# Patient Record
Sex: MALE | Race: OTHER
[De-identification: names, ages, dates, MRNs, and addresses within clinical notes are randomized per-mention and may not be internally consistent; named-entity substitution may affect disease eponyms.]

---

## 2019-10-28 ENCOUNTER — HOSPITAL ENCOUNTER (INPATIENT)
Dept: HOSPITAL 15 - ER | Age: 64
LOS: 9 days | Discharge: HOME HEALTH | DRG: 871 | End: 2019-11-06
Attending: INTERNAL MEDICINE | Admitting: NURSE PRACTITIONER
Payer: MEDICARE

## 2019-10-28 VITALS — SYSTOLIC BLOOD PRESSURE: 99 MMHG | DIASTOLIC BLOOD PRESSURE: 67 MMHG

## 2019-10-28 VITALS — SYSTOLIC BLOOD PRESSURE: 94 MMHG | DIASTOLIC BLOOD PRESSURE: 59 MMHG

## 2019-10-28 VITALS — SYSTOLIC BLOOD PRESSURE: 100 MMHG | DIASTOLIC BLOOD PRESSURE: 62 MMHG

## 2019-10-28 VITALS — DIASTOLIC BLOOD PRESSURE: 62 MMHG | SYSTOLIC BLOOD PRESSURE: 96 MMHG

## 2019-10-28 VITALS — DIASTOLIC BLOOD PRESSURE: 68 MMHG | SYSTOLIC BLOOD PRESSURE: 100 MMHG

## 2019-10-28 VITALS — DIASTOLIC BLOOD PRESSURE: 78 MMHG | SYSTOLIC BLOOD PRESSURE: 117 MMHG

## 2019-10-28 VITALS — SYSTOLIC BLOOD PRESSURE: 115 MMHG | DIASTOLIC BLOOD PRESSURE: 72 MMHG

## 2019-10-28 VITALS — SYSTOLIC BLOOD PRESSURE: 94 MMHG | DIASTOLIC BLOOD PRESSURE: 58 MMHG

## 2019-10-28 VITALS — SYSTOLIC BLOOD PRESSURE: 115 MMHG | DIASTOLIC BLOOD PRESSURE: 81 MMHG

## 2019-10-28 VITALS — DIASTOLIC BLOOD PRESSURE: 65 MMHG | SYSTOLIC BLOOD PRESSURE: 100 MMHG

## 2019-10-28 VITALS — HEIGHT: 71 IN | BODY MASS INDEX: 28.06 KG/M2 | WEIGHT: 200.4 LBS

## 2019-10-28 VITALS — DIASTOLIC BLOOD PRESSURE: 57 MMHG | SYSTOLIC BLOOD PRESSURE: 89 MMHG

## 2019-10-28 VITALS — SYSTOLIC BLOOD PRESSURE: 94 MMHG | DIASTOLIC BLOOD PRESSURE: 48 MMHG

## 2019-10-28 VITALS — DIASTOLIC BLOOD PRESSURE: 73 MMHG | SYSTOLIC BLOOD PRESSURE: 117 MMHG

## 2019-10-28 VITALS — SYSTOLIC BLOOD PRESSURE: 103 MMHG | DIASTOLIC BLOOD PRESSURE: 71 MMHG

## 2019-10-28 VITALS — DIASTOLIC BLOOD PRESSURE: 68 MMHG | SYSTOLIC BLOOD PRESSURE: 94 MMHG

## 2019-10-28 VITALS — DIASTOLIC BLOOD PRESSURE: 71 MMHG | SYSTOLIC BLOOD PRESSURE: 103 MMHG

## 2019-10-28 VITALS — DIASTOLIC BLOOD PRESSURE: 71 MMHG | SYSTOLIC BLOOD PRESSURE: 108 MMHG

## 2019-10-28 VITALS — DIASTOLIC BLOOD PRESSURE: 72 MMHG | SYSTOLIC BLOOD PRESSURE: 117 MMHG

## 2019-10-28 DIAGNOSIS — J15.6: ICD-10-CM

## 2019-10-28 DIAGNOSIS — I50.33: ICD-10-CM

## 2019-10-28 DIAGNOSIS — J44.0: ICD-10-CM

## 2019-10-28 DIAGNOSIS — E11.9: ICD-10-CM

## 2019-10-28 DIAGNOSIS — A41.9: Primary | ICD-10-CM

## 2019-10-28 DIAGNOSIS — Z99.11: ICD-10-CM

## 2019-10-28 DIAGNOSIS — Z79.84: ICD-10-CM

## 2019-10-28 DIAGNOSIS — J96.02: ICD-10-CM

## 2019-10-28 DIAGNOSIS — I11.0: ICD-10-CM

## 2019-10-28 DIAGNOSIS — G92: ICD-10-CM

## 2019-10-28 DIAGNOSIS — E78.5: ICD-10-CM

## 2019-10-28 DIAGNOSIS — J96.01: ICD-10-CM

## 2019-10-28 DIAGNOSIS — N17.0: ICD-10-CM

## 2019-10-28 DIAGNOSIS — Z90.49: ICD-10-CM

## 2019-10-28 DIAGNOSIS — E78.00: ICD-10-CM

## 2019-10-28 DIAGNOSIS — F17.200: ICD-10-CM

## 2019-10-28 DIAGNOSIS — R65.21: ICD-10-CM

## 2019-10-28 DIAGNOSIS — E44.1: ICD-10-CM

## 2019-10-28 DIAGNOSIS — M19.90: ICD-10-CM

## 2019-10-28 LAB
ALBUMIN SERPL-MCNC: 3.1 G/DL (ref 3.4–5)
ALP SERPL-CCNC: 69 U/L (ref 45–117)
ALT SERPL-CCNC: 22 U/L (ref 16–61)
ANION GAP SERPL CALCULATED.3IONS-SCNC: 16 MMOL/L (ref 5–15)
APTT PPP: 29 SEC (ref 23.64–32.05)
BILIRUB SERPL-MCNC: 1 MG/DL (ref 0.2–1)
BUN SERPL-MCNC: 45 MG/DL (ref 7–18)
BUN/CREAT SERPL: 12.1
CALCIUM SERPL-MCNC: 8.7 MG/DL (ref 8.5–10.1)
CHLORIDE SERPL-SCNC: 103 MMOL/L (ref 98–107)
CO2 SERPL-SCNC: 19 MMOL/L (ref 21–32)
GLUCOSE SERPL-MCNC: 172 MG/DL (ref 74–106)
HCT VFR BLD AUTO: 43.4 % (ref 41–53)
HGB BLD-MCNC: 15.2 G/DL (ref 13.5–17.5)
INR PPP: 1.38 (ref 0.9–1.15)
LACTATE PLASV-SCNC: 4.4 MMOL/L (ref 0.4–2)
MCH RBC QN AUTO: 32.8 PG (ref 28–32)
MCV RBC AUTO: 93.5 FL (ref 80–100)
NRBC BLD QL AUTO: 0.1 %
POTASSIUM SERPL-SCNC: 3.7 MMOL/L (ref 3.5–5.1)
PROT SERPL-MCNC: 7.5 G/DL (ref 6.4–8.2)
SODIUM SERPL-SCNC: 138 MMOL/L (ref 136–145)

## 2019-10-28 PROCEDURE — 80307 DRUG TEST PRSMV CHEM ANLYZR: CPT

## 2019-10-28 PROCEDURE — 82962 GLUCOSE BLOOD TEST: CPT

## 2019-10-28 PROCEDURE — 93970 EXTREMITY STUDY: CPT

## 2019-10-28 PROCEDURE — 80061 LIPID PANEL: CPT

## 2019-10-28 PROCEDURE — 83880 ASSAY OF NATRIURETIC PEPTIDE: CPT

## 2019-10-28 PROCEDURE — 71250 CT THORAX DX C-: CPT

## 2019-10-28 PROCEDURE — 36600 WITHDRAWAL OF ARTERIAL BLOOD: CPT

## 2019-10-28 PROCEDURE — 87186 SC STD MICRODIL/AGAR DIL: CPT

## 2019-10-28 PROCEDURE — 87205 SMEAR GRAM STAIN: CPT

## 2019-10-28 PROCEDURE — 93005 ELECTROCARDIOGRAM TRACING: CPT

## 2019-10-28 PROCEDURE — 83036 HEMOGLOBIN GLYCOSYLATED A1C: CPT

## 2019-10-28 PROCEDURE — 87070 CULTURE OTHR SPECIMN AEROBIC: CPT

## 2019-10-28 PROCEDURE — 94003 VENT MGMT INPAT SUBQ DAY: CPT

## 2019-10-28 PROCEDURE — 83605 ASSAY OF LACTIC ACID: CPT

## 2019-10-28 PROCEDURE — 94640 AIRWAY INHALATION TREATMENT: CPT

## 2019-10-28 PROCEDURE — 85027 COMPLETE CBC AUTOMATED: CPT

## 2019-10-28 PROCEDURE — 36415 COLL VENOUS BLD VENIPUNCTURE: CPT

## 2019-10-28 PROCEDURE — 82805 BLOOD GASES W/O2 SATURATION: CPT

## 2019-10-28 PROCEDURE — 0BH17EZ INSERTION OF ENDOTRACHEAL AIRWAY INTO TRACHEA, VIA NATURAL OR ARTIFICIAL OPENING: ICD-10-PCS | Performed by: INTERNAL MEDICINE

## 2019-10-28 PROCEDURE — 96365 THER/PROPH/DIAG IV INF INIT: CPT

## 2019-10-28 PROCEDURE — 97116 GAIT TRAINING THERAPY: CPT

## 2019-10-28 PROCEDURE — 85025 COMPLETE CBC W/AUTO DIFF WBC: CPT

## 2019-10-28 PROCEDURE — 84702 CHORIONIC GONADOTROPIN TEST: CPT

## 2019-10-28 PROCEDURE — 02HV33Z INSERTION OF INFUSION DEVICE INTO SUPERIOR VENA CAVA, PERCUTANEOUS APPROACH: ICD-10-PCS | Performed by: RADIOLOGY

## 2019-10-28 PROCEDURE — 87804 INFLUENZA ASSAY W/OPTIC: CPT

## 2019-10-28 PROCEDURE — 96367 TX/PROPH/DG ADDL SEQ IV INF: CPT

## 2019-10-28 PROCEDURE — 81001 URINALYSIS AUTO W/SCOPE: CPT

## 2019-10-28 PROCEDURE — 71045 X-RAY EXAM CHEST 1 VIEW: CPT

## 2019-10-28 PROCEDURE — 97163 PT EVAL HIGH COMPLEX 45 MIN: CPT

## 2019-10-28 PROCEDURE — 94002 VENT MGMT INPAT INIT DAY: CPT

## 2019-10-28 PROCEDURE — 5A1945Z RESPIRATORY VENTILATION, 24-96 CONSECUTIVE HOURS: ICD-10-PCS | Performed by: INTERNAL MEDICINE

## 2019-10-28 PROCEDURE — 99291 CRITICAL CARE FIRST HOUR: CPT

## 2019-10-28 PROCEDURE — 87040 BLOOD CULTURE FOR BACTERIA: CPT

## 2019-10-28 PROCEDURE — 36556 INSERT NON-TUNNEL CV CATH: CPT

## 2019-10-28 PROCEDURE — 51702 INSERT TEMP BLADDER CATH: CPT

## 2019-10-28 PROCEDURE — 85730 THROMBOPLASTIN TIME PARTIAL: CPT

## 2019-10-28 PROCEDURE — 31624 DX BRONCHOSCOPE/LAVAGE: CPT

## 2019-10-28 PROCEDURE — 31500 INSERT EMERGENCY AIRWAY: CPT

## 2019-10-28 PROCEDURE — 82550 ASSAY OF CK (CPK): CPT

## 2019-10-28 PROCEDURE — 84484 ASSAY OF TROPONIN QUANT: CPT

## 2019-10-28 PROCEDURE — 97530 THERAPEUTIC ACTIVITIES: CPT

## 2019-10-28 PROCEDURE — 74176 CT ABD & PELVIS W/O CONTRAST: CPT

## 2019-10-28 PROCEDURE — 85007 BL SMEAR W/DIFF WBC COUNT: CPT

## 2019-10-28 PROCEDURE — 83735 ASSAY OF MAGNESIUM: CPT

## 2019-10-28 PROCEDURE — 31622 DX BRONCHOSCOPE/WASH: CPT

## 2019-10-28 PROCEDURE — 85379 FIBRIN DEGRADATION QUANT: CPT

## 2019-10-28 PROCEDURE — 87077 CULTURE AEROBIC IDENTIFY: CPT

## 2019-10-28 PROCEDURE — 80048 BASIC METABOLIC PNL TOTAL CA: CPT

## 2019-10-28 PROCEDURE — 80053 COMPREHEN METABOLIC PANEL: CPT

## 2019-10-28 PROCEDURE — 85610 PROTHROMBIN TIME: CPT

## 2019-10-28 PROCEDURE — 93306 TTE W/DOPPLER COMPLETE: CPT

## 2019-10-28 PROCEDURE — 87081 CULTURE SCREEN ONLY: CPT

## 2019-10-28 RX ADMIN — FENTANYL CITRATE SCH MLS/HR: 50 INJECTION, SOLUTION INTRAMUSCULAR; INTRAVENOUS at 18:44

## 2019-10-28 RX ADMIN — SODIUM CHLORIDE SCH MLS/HR: 0.9 INJECTION, SOLUTION INTRAVENOUS at 19:20

## 2019-10-28 RX ADMIN — SODIUM CHLORIDE SCH MG: 9 INJECTION, SOLUTION INTRAVENOUS at 23:00

## 2019-10-28 RX ADMIN — AZITHROMYCIN DIHYDRATE SCH MLS/HR: 500 INJECTION, POWDER, LYOPHILIZED, FOR SOLUTION INTRAVENOUS at 20:37

## 2019-10-28 RX ADMIN — NOREPINEPHRINE BITARTRATE SCH MLS/HR: 1 INJECTION, SOLUTION, CONCENTRATE INTRAVENOUS at 18:15

## 2019-10-28 RX ADMIN — MIDAZOLAM SCH MLS/HR: 5 INJECTION, SOLUTION INTRAMUSCULAR; INTRAVENOUS at 18:18

## 2019-10-28 NOTE — NUR
ELEVATED HR/ABG

NP CHE PAGED. UPDATED REGARDING MOST RECENT ABG RESULTS AND CURRENT HR. ORDERS RECEIVED.

## 2019-10-28 NOTE — NUR
Respiratory note:

ET TUBE MOVED FROM 22 CM AT THE LIP TO 20 CM AT THE LIP AFTER REVIEW OF CHEST XRAY.

## 2019-10-28 NOTE — NUR
FAMILY VISIT

PT DAUGHTER TO BEDSIDE FOR VISIT. PT CONDITION AND PLAN OF CARE DISCUSSED. ALL QUESTIONS AND 
CONCERNS ADDRESSED.

## 2019-10-28 NOTE — NUR
Admit to ICU from ER on NAATLYA Laurent Admitted to ICU via gurney on cardiac monitor, intubated and being bagged 
by Respiratory Therapist. Patient transfered to bed, connected to mechanical ventilator by 
therapist, LINDA at bedside.  Patient connected to ICU monitoring, weighed by bedscale, 
oriented to Carol Granados, primary RN, unit, ventilator and sedation.

NOTE: PT TO UNIT ON LEVOPHED GTT 25 MCG/MIN, FENTANYL 150 MCG/HR, AND VERSED 15 MG/HR. 
INCREASED WORK OF BREATHING OBSERVED. INCREASED FENTANYL GTT. SEE IV SPREADSHEET. SINUS TACH 
ON CARDIAC MONITOR. PT WITH NGT TO L. NARE CLAMPED. PLACEMENT VERIFIED. R IJ TLC IN PLACE 
ALL PORTS PATENT. L. EJ S/L B&P. LAGUNA TO GRAVITY DRAINING CLOUDY LIGHT MARISSA URINE. BED IN 
LOWEST LOCKED POSITION. HOB ELEVATED. SIDE RAILS UP X 2. NO INDICATION OF PAIN OBSERVED. PT 
IN FULL VIEW OF RN STATION. WILL CONTINUE TO MONITOR.

## 2019-10-29 VITALS — SYSTOLIC BLOOD PRESSURE: 113 MMHG | DIASTOLIC BLOOD PRESSURE: 71 MMHG

## 2019-10-29 VITALS — DIASTOLIC BLOOD PRESSURE: 68 MMHG | SYSTOLIC BLOOD PRESSURE: 95 MMHG

## 2019-10-29 VITALS — DIASTOLIC BLOOD PRESSURE: 53 MMHG | SYSTOLIC BLOOD PRESSURE: 93 MMHG

## 2019-10-29 VITALS — DIASTOLIC BLOOD PRESSURE: 78 MMHG | SYSTOLIC BLOOD PRESSURE: 110 MMHG

## 2019-10-29 VITALS — SYSTOLIC BLOOD PRESSURE: 108 MMHG | DIASTOLIC BLOOD PRESSURE: 68 MMHG

## 2019-10-29 VITALS — SYSTOLIC BLOOD PRESSURE: 100 MMHG | DIASTOLIC BLOOD PRESSURE: 77 MMHG

## 2019-10-29 VITALS — SYSTOLIC BLOOD PRESSURE: 109 MMHG | DIASTOLIC BLOOD PRESSURE: 68 MMHG

## 2019-10-29 VITALS — DIASTOLIC BLOOD PRESSURE: 70 MMHG | SYSTOLIC BLOOD PRESSURE: 128 MMHG

## 2019-10-29 VITALS — SYSTOLIC BLOOD PRESSURE: 96 MMHG | DIASTOLIC BLOOD PRESSURE: 60 MMHG

## 2019-10-29 VITALS — DIASTOLIC BLOOD PRESSURE: 68 MMHG | SYSTOLIC BLOOD PRESSURE: 113 MMHG

## 2019-10-29 VITALS — SYSTOLIC BLOOD PRESSURE: 110 MMHG | DIASTOLIC BLOOD PRESSURE: 67 MMHG

## 2019-10-29 VITALS — DIASTOLIC BLOOD PRESSURE: 66 MMHG | SYSTOLIC BLOOD PRESSURE: 135 MMHG

## 2019-10-29 VITALS — SYSTOLIC BLOOD PRESSURE: 128 MMHG | DIASTOLIC BLOOD PRESSURE: 74 MMHG

## 2019-10-29 VITALS — DIASTOLIC BLOOD PRESSURE: 74 MMHG | SYSTOLIC BLOOD PRESSURE: 126 MMHG

## 2019-10-29 VITALS — SYSTOLIC BLOOD PRESSURE: 82 MMHG | DIASTOLIC BLOOD PRESSURE: 54 MMHG

## 2019-10-29 VITALS — SYSTOLIC BLOOD PRESSURE: 83 MMHG | DIASTOLIC BLOOD PRESSURE: 49 MMHG

## 2019-10-29 VITALS — SYSTOLIC BLOOD PRESSURE: 100 MMHG | DIASTOLIC BLOOD PRESSURE: 61 MMHG

## 2019-10-29 VITALS — DIASTOLIC BLOOD PRESSURE: 68 MMHG | SYSTOLIC BLOOD PRESSURE: 120 MMHG

## 2019-10-29 VITALS — SYSTOLIC BLOOD PRESSURE: 103 MMHG | DIASTOLIC BLOOD PRESSURE: 70 MMHG

## 2019-10-29 VITALS — SYSTOLIC BLOOD PRESSURE: 121 MMHG | DIASTOLIC BLOOD PRESSURE: 73 MMHG

## 2019-10-29 VITALS — SYSTOLIC BLOOD PRESSURE: 89 MMHG | DIASTOLIC BLOOD PRESSURE: 58 MMHG

## 2019-10-29 VITALS — DIASTOLIC BLOOD PRESSURE: 60 MMHG | SYSTOLIC BLOOD PRESSURE: 98 MMHG

## 2019-10-29 VITALS — SYSTOLIC BLOOD PRESSURE: 125 MMHG | DIASTOLIC BLOOD PRESSURE: 73 MMHG

## 2019-10-29 VITALS — DIASTOLIC BLOOD PRESSURE: 67 MMHG | SYSTOLIC BLOOD PRESSURE: 118 MMHG

## 2019-10-29 VITALS — DIASTOLIC BLOOD PRESSURE: 52 MMHG | SYSTOLIC BLOOD PRESSURE: 83 MMHG

## 2019-10-29 VITALS — SYSTOLIC BLOOD PRESSURE: 83 MMHG | DIASTOLIC BLOOD PRESSURE: 59 MMHG

## 2019-10-29 VITALS — SYSTOLIC BLOOD PRESSURE: 112 MMHG | DIASTOLIC BLOOD PRESSURE: 69 MMHG

## 2019-10-29 VITALS — DIASTOLIC BLOOD PRESSURE: 73 MMHG | SYSTOLIC BLOOD PRESSURE: 123 MMHG

## 2019-10-29 VITALS — DIASTOLIC BLOOD PRESSURE: 63 MMHG | SYSTOLIC BLOOD PRESSURE: 108 MMHG

## 2019-10-29 VITALS — DIASTOLIC BLOOD PRESSURE: 75 MMHG | SYSTOLIC BLOOD PRESSURE: 131 MMHG

## 2019-10-29 VITALS — SYSTOLIC BLOOD PRESSURE: 81 MMHG | DIASTOLIC BLOOD PRESSURE: 50 MMHG

## 2019-10-29 VITALS — SYSTOLIC BLOOD PRESSURE: 98 MMHG | DIASTOLIC BLOOD PRESSURE: 65 MMHG

## 2019-10-29 VITALS — DIASTOLIC BLOOD PRESSURE: 55 MMHG | SYSTOLIC BLOOD PRESSURE: 84 MMHG

## 2019-10-29 VITALS — SYSTOLIC BLOOD PRESSURE: 124 MMHG | DIASTOLIC BLOOD PRESSURE: 75 MMHG

## 2019-10-29 VITALS — SYSTOLIC BLOOD PRESSURE: 126 MMHG | DIASTOLIC BLOOD PRESSURE: 72 MMHG

## 2019-10-29 VITALS — DIASTOLIC BLOOD PRESSURE: 56 MMHG | SYSTOLIC BLOOD PRESSURE: 88 MMHG

## 2019-10-29 VITALS — SYSTOLIC BLOOD PRESSURE: 85 MMHG | DIASTOLIC BLOOD PRESSURE: 55 MMHG

## 2019-10-29 VITALS — DIASTOLIC BLOOD PRESSURE: 62 MMHG | SYSTOLIC BLOOD PRESSURE: 88 MMHG

## 2019-10-29 VITALS — DIASTOLIC BLOOD PRESSURE: 69 MMHG | SYSTOLIC BLOOD PRESSURE: 129 MMHG

## 2019-10-29 VITALS — SYSTOLIC BLOOD PRESSURE: 89 MMHG | DIASTOLIC BLOOD PRESSURE: 48 MMHG

## 2019-10-29 VITALS — DIASTOLIC BLOOD PRESSURE: 58 MMHG | SYSTOLIC BLOOD PRESSURE: 88 MMHG

## 2019-10-29 VITALS — SYSTOLIC BLOOD PRESSURE: 111 MMHG | DIASTOLIC BLOOD PRESSURE: 68 MMHG

## 2019-10-29 VITALS — DIASTOLIC BLOOD PRESSURE: 72 MMHG | SYSTOLIC BLOOD PRESSURE: 108 MMHG

## 2019-10-29 VITALS — SYSTOLIC BLOOD PRESSURE: 84 MMHG | DIASTOLIC BLOOD PRESSURE: 53 MMHG

## 2019-10-29 VITALS — SYSTOLIC BLOOD PRESSURE: 109 MMHG | DIASTOLIC BLOOD PRESSURE: 69 MMHG

## 2019-10-29 VITALS — DIASTOLIC BLOOD PRESSURE: 60 MMHG | SYSTOLIC BLOOD PRESSURE: 89 MMHG

## 2019-10-29 VITALS — SYSTOLIC BLOOD PRESSURE: 85 MMHG | DIASTOLIC BLOOD PRESSURE: 50 MMHG

## 2019-10-29 VITALS — SYSTOLIC BLOOD PRESSURE: 120 MMHG | DIASTOLIC BLOOD PRESSURE: 72 MMHG

## 2019-10-29 VITALS — DIASTOLIC BLOOD PRESSURE: 71 MMHG | SYSTOLIC BLOOD PRESSURE: 115 MMHG

## 2019-10-29 VITALS — DIASTOLIC BLOOD PRESSURE: 55 MMHG | SYSTOLIC BLOOD PRESSURE: 80 MMHG

## 2019-10-29 VITALS — SYSTOLIC BLOOD PRESSURE: 103 MMHG | DIASTOLIC BLOOD PRESSURE: 73 MMHG

## 2019-10-29 VITALS — DIASTOLIC BLOOD PRESSURE: 62 MMHG | SYSTOLIC BLOOD PRESSURE: 102 MMHG

## 2019-10-29 VITALS — DIASTOLIC BLOOD PRESSURE: 68 MMHG | SYSTOLIC BLOOD PRESSURE: 110 MMHG

## 2019-10-29 VITALS — DIASTOLIC BLOOD PRESSURE: 64 MMHG | SYSTOLIC BLOOD PRESSURE: 130 MMHG

## 2019-10-29 VITALS — DIASTOLIC BLOOD PRESSURE: 67 MMHG | SYSTOLIC BLOOD PRESSURE: 107 MMHG

## 2019-10-29 VITALS — SYSTOLIC BLOOD PRESSURE: 73 MMHG | DIASTOLIC BLOOD PRESSURE: 46 MMHG

## 2019-10-29 VITALS — DIASTOLIC BLOOD PRESSURE: 69 MMHG | SYSTOLIC BLOOD PRESSURE: 112 MMHG

## 2019-10-29 VITALS — SYSTOLIC BLOOD PRESSURE: 100 MMHG | DIASTOLIC BLOOD PRESSURE: 64 MMHG

## 2019-10-29 VITALS — SYSTOLIC BLOOD PRESSURE: 113 MMHG | DIASTOLIC BLOOD PRESSURE: 75 MMHG

## 2019-10-29 VITALS — SYSTOLIC BLOOD PRESSURE: 92 MMHG | DIASTOLIC BLOOD PRESSURE: 60 MMHG

## 2019-10-29 VITALS — SYSTOLIC BLOOD PRESSURE: 97 MMHG | DIASTOLIC BLOOD PRESSURE: 57 MMHG

## 2019-10-29 VITALS — DIASTOLIC BLOOD PRESSURE: 68 MMHG | SYSTOLIC BLOOD PRESSURE: 111 MMHG

## 2019-10-29 VITALS — SYSTOLIC BLOOD PRESSURE: 143 MMHG | DIASTOLIC BLOOD PRESSURE: 71 MMHG

## 2019-10-29 VITALS — SYSTOLIC BLOOD PRESSURE: 131 MMHG | DIASTOLIC BLOOD PRESSURE: 74 MMHG

## 2019-10-29 VITALS — DIASTOLIC BLOOD PRESSURE: 49 MMHG | SYSTOLIC BLOOD PRESSURE: 81 MMHG

## 2019-10-29 VITALS — SYSTOLIC BLOOD PRESSURE: 112 MMHG | DIASTOLIC BLOOD PRESSURE: 74 MMHG

## 2019-10-29 VITALS — SYSTOLIC BLOOD PRESSURE: 115 MMHG | DIASTOLIC BLOOD PRESSURE: 67 MMHG

## 2019-10-29 VITALS — SYSTOLIC BLOOD PRESSURE: 119 MMHG | DIASTOLIC BLOOD PRESSURE: 70 MMHG

## 2019-10-29 VITALS — SYSTOLIC BLOOD PRESSURE: 89 MMHG | DIASTOLIC BLOOD PRESSURE: 60 MMHG

## 2019-10-29 VITALS — DIASTOLIC BLOOD PRESSURE: 57 MMHG | SYSTOLIC BLOOD PRESSURE: 85 MMHG

## 2019-10-29 VITALS — DIASTOLIC BLOOD PRESSURE: 73 MMHG | SYSTOLIC BLOOD PRESSURE: 127 MMHG

## 2019-10-29 VITALS — DIASTOLIC BLOOD PRESSURE: 60 MMHG | SYSTOLIC BLOOD PRESSURE: 121 MMHG

## 2019-10-29 VITALS — DIASTOLIC BLOOD PRESSURE: 50 MMHG | SYSTOLIC BLOOD PRESSURE: 99 MMHG

## 2019-10-29 VITALS — DIASTOLIC BLOOD PRESSURE: 68 MMHG | SYSTOLIC BLOOD PRESSURE: 109 MMHG

## 2019-10-29 VITALS — SYSTOLIC BLOOD PRESSURE: 79 MMHG | DIASTOLIC BLOOD PRESSURE: 49 MMHG

## 2019-10-29 VITALS — DIASTOLIC BLOOD PRESSURE: 70 MMHG | SYSTOLIC BLOOD PRESSURE: 126 MMHG

## 2019-10-29 VITALS — DIASTOLIC BLOOD PRESSURE: 71 MMHG | SYSTOLIC BLOOD PRESSURE: 116 MMHG

## 2019-10-29 VITALS — SYSTOLIC BLOOD PRESSURE: 111 MMHG | DIASTOLIC BLOOD PRESSURE: 69 MMHG

## 2019-10-29 VITALS — DIASTOLIC BLOOD PRESSURE: 81 MMHG | SYSTOLIC BLOOD PRESSURE: 127 MMHG

## 2019-10-29 VITALS — SYSTOLIC BLOOD PRESSURE: 118 MMHG | DIASTOLIC BLOOD PRESSURE: 71 MMHG

## 2019-10-29 VITALS — DIASTOLIC BLOOD PRESSURE: 71 MMHG | SYSTOLIC BLOOD PRESSURE: 112 MMHG

## 2019-10-29 VITALS — SYSTOLIC BLOOD PRESSURE: 90 MMHG | DIASTOLIC BLOOD PRESSURE: 53 MMHG

## 2019-10-29 VITALS — DIASTOLIC BLOOD PRESSURE: 56 MMHG | SYSTOLIC BLOOD PRESSURE: 81 MMHG

## 2019-10-29 VITALS — SYSTOLIC BLOOD PRESSURE: 107 MMHG | DIASTOLIC BLOOD PRESSURE: 67 MMHG

## 2019-10-29 VITALS — SYSTOLIC BLOOD PRESSURE: 126 MMHG | DIASTOLIC BLOOD PRESSURE: 73 MMHG

## 2019-10-29 VITALS — DIASTOLIC BLOOD PRESSURE: 74 MMHG | SYSTOLIC BLOOD PRESSURE: 128 MMHG

## 2019-10-29 VITALS — DIASTOLIC BLOOD PRESSURE: 50 MMHG | SYSTOLIC BLOOD PRESSURE: 82 MMHG

## 2019-10-29 VITALS — SYSTOLIC BLOOD PRESSURE: 90 MMHG | DIASTOLIC BLOOD PRESSURE: 57 MMHG

## 2019-10-29 VITALS — SYSTOLIC BLOOD PRESSURE: 88 MMHG | DIASTOLIC BLOOD PRESSURE: 62 MMHG

## 2019-10-29 VITALS — DIASTOLIC BLOOD PRESSURE: 70 MMHG | SYSTOLIC BLOOD PRESSURE: 127 MMHG

## 2019-10-29 VITALS — SYSTOLIC BLOOD PRESSURE: 122 MMHG | DIASTOLIC BLOOD PRESSURE: 77 MMHG

## 2019-10-29 VITALS — DIASTOLIC BLOOD PRESSURE: 54 MMHG | SYSTOLIC BLOOD PRESSURE: 89 MMHG

## 2019-10-29 VITALS — DIASTOLIC BLOOD PRESSURE: 54 MMHG | SYSTOLIC BLOOD PRESSURE: 93 MMHG

## 2019-10-29 VITALS — SYSTOLIC BLOOD PRESSURE: 115 MMHG | DIASTOLIC BLOOD PRESSURE: 72 MMHG

## 2019-10-29 LAB
ALCOHOL, URINE: < 3 MG/DL (ref 0–5)
AMPHETAMINES UR QL SCN: POSITIVE
ANION GAP SERPL CALCULATED.3IONS-SCNC: 10 MMOL/L (ref 5–15)
BARBITURATES UR QL SCN: NEGATIVE
BENZODIAZ UR QL SCN: POSITIVE
BUN SERPL-MCNC: 49 MG/DL (ref 7–18)
BUN/CREAT SERPL: 16.5
BZE UR QL SCN: NEGATIVE
CALCIUM SERPL-MCNC: 6.9 MG/DL (ref 8.5–10.1)
CANNABINOIDS UR QL SCN: NEGATIVE
CHLORIDE SERPL-SCNC: 108 MMOL/L (ref 98–107)
CHOLEST SERPL-MCNC: 114 MG/DL (ref ?–200)
CO2 SERPL-SCNC: 21 MMOL/L (ref 21–32)
GLUCOSE SERPL-MCNC: 161 MG/DL (ref 74–106)
HCT VFR BLD AUTO: 41.9 % (ref 41–53)
HDLC SERPL-MCNC: 16 MG/DL (ref 40–59)
HGB BLD-MCNC: 14.5 G/DL (ref 13.5–17.5)
MCH RBC QN AUTO: 32.5 PG (ref 28–32)
MCV RBC AUTO: 94.1 FL (ref 80–100)
NRBC BLD QL AUTO: 0.1 %
OPIATES UR QL SCN: NEGATIVE
PCP UR QL SCN: NEGATIVE
POTASSIUM SERPL-SCNC: 3.9 MMOL/L (ref 3.5–5.1)
SODIUM SERPL-SCNC: 139 MMOL/L (ref 136–145)
TRIGL SERPL-MCNC: 176 MG/DL (ref ?–150)

## 2019-10-29 RX ADMIN — PIPERACILLIN SODIUM AND TAZOBACTAM SODIUM SCH MLS/HR: .25; 2 INJECTION, POWDER, LYOPHILIZED, FOR SOLUTION INTRAVENOUS at 18:13

## 2019-10-29 RX ADMIN — ALBUTEROL SULFATE SCH MG: 2.5 SOLUTION RESPIRATORY (INHALATION) at 18:28

## 2019-10-29 RX ADMIN — IPRATROPIUM BROMIDE SCH MG: 0.5 SOLUTION RESPIRATORY (INHALATION) at 18:28

## 2019-10-29 RX ADMIN — Medication SCH STRIP: at 11:32

## 2019-10-29 RX ADMIN — MIDAZOLAM SCH MLS/HR: 5 INJECTION, SOLUTION INTRAMUSCULAR; INTRAVENOUS at 21:06

## 2019-10-29 RX ADMIN — HUMAN INSULIN SCH UNITS: 100 INJECTION, SOLUTION SUBCUTANEOUS at 00:51

## 2019-10-29 RX ADMIN — HUMAN INSULIN SCH UNITS: 100 INJECTION, SOLUTION SUBCUTANEOUS at 05:25

## 2019-10-29 RX ADMIN — MIDAZOLAM SCH MLS/HR: 5 INJECTION, SOLUTION INTRAMUSCULAR; INTRAVENOUS at 16:08

## 2019-10-29 RX ADMIN — MIDAZOLAM SCH MLS/HR: 5 INJECTION, SOLUTION INTRAMUSCULAR; INTRAVENOUS at 00:29

## 2019-10-29 RX ADMIN — SODIUM CHLORIDE SCH MLS/HR: 0.9 INJECTION, SOLUTION INTRAVENOUS at 04:45

## 2019-10-29 RX ADMIN — MIDAZOLAM SCH MLS/HR: 5 INJECTION, SOLUTION INTRAMUSCULAR; INTRAVENOUS at 08:29

## 2019-10-29 RX ADMIN — HYDROCORTISONE SODIUM SUCCINATE SCH MG: 100 INJECTION, POWDER, FOR SOLUTION INTRAMUSCULAR; INTRAVENOUS at 18:10

## 2019-10-29 RX ADMIN — AZITHROMYCIN DIHYDRATE SCH MLS/HR: 500 INJECTION, POWDER, LYOPHILIZED, FOR SOLUTION INTRAVENOUS at 10:11

## 2019-10-29 RX ADMIN — IPRATROPIUM BROMIDE SCH MG: 0.5 SOLUTION RESPIRATORY (INHALATION) at 11:49

## 2019-10-29 RX ADMIN — ALBUTEROL SULFATE SCH MG: 2.5 SOLUTION RESPIRATORY (INHALATION) at 11:50

## 2019-10-29 RX ADMIN — Medication SCH STRIP: at 05:24

## 2019-10-29 RX ADMIN — NOREPINEPHRINE BITARTRATE SCH MLS/HR: 1 INJECTION, SOLUTION, CONCENTRATE INTRAVENOUS at 16:08

## 2019-10-29 RX ADMIN — ENOXAPARIN SODIUM SCH MG: 30 INJECTION SUBCUTANEOUS at 10:11

## 2019-10-29 RX ADMIN — HUMAN INSULIN SCH UNITS: 100 INJECTION, SOLUTION SUBCUTANEOUS at 18:13

## 2019-10-29 RX ADMIN — IPRATROPIUM BROMIDE SCH MG: 0.5 SOLUTION RESPIRATORY (INHALATION) at 06:06

## 2019-10-29 RX ADMIN — ALBUTEROL SULFATE SCH MG: 2.5 SOLUTION RESPIRATORY (INHALATION) at 06:07

## 2019-10-29 RX ADMIN — SODIUM CHLORIDE SCH MG: 9 INJECTION, SOLUTION INTRAVENOUS at 10:11

## 2019-10-29 RX ADMIN — ALBUTEROL SULFATE SCH MG: 2.5 SOLUTION RESPIRATORY (INHALATION) at 00:02

## 2019-10-29 RX ADMIN — Medication SCH STRIP: at 00:50

## 2019-10-29 RX ADMIN — HUMAN INSULIN SCH UNITS: 100 INJECTION, SOLUTION SUBCUTANEOUS at 11:32

## 2019-10-29 RX ADMIN — IPRATROPIUM BROMIDE SCH MG: 0.5 SOLUTION RESPIRATORY (INHALATION) at 00:02

## 2019-10-29 RX ADMIN — MIDAZOLAM SCH MLS/HR: 5 INJECTION, SOLUTION INTRAMUSCULAR; INTRAVENOUS at 13:52

## 2019-10-29 RX ADMIN — DEXTROSE SCH MLS/HR: 50 INJECTION, SOLUTION INTRAVENOUS at 10:15

## 2019-10-29 RX ADMIN — LINEZOLID SCH MLS/HR: 600 INJECTION, SOLUTION INTRAVENOUS at 21:55

## 2019-10-29 RX ADMIN — NOREPINEPHRINE BITARTRATE SCH MLS/HR: 1 INJECTION, SOLUTION, CONCENTRATE INTRAVENOUS at 00:30

## 2019-10-29 RX ADMIN — Medication SCH STRIP: at 18:10

## 2019-10-29 RX ADMIN — FENTANYL CITRATE SCH MLS/HR: 50 INJECTION, SOLUTION INTRAMUSCULAR; INTRAVENOUS at 16:07

## 2019-10-29 NOTE — NUR
SOTERO CHE NP PLACED RIGHT FEMORAL ATRIAL LINE WITHOUT ISSUES. FAP HOOKED UP TO MONITOR. SEE 
ASSESMENT SHEET.

## 2019-10-29 NOTE — NUR
Nutrition Assessment/consult Notes



please see attached link for complete assessment



Est. Needs BW 94 [id number] kcal (23-25 kcal/kgBW), 75-94 gms pro (0.8-1.0 gms/kgBW r/t 
elev RFT). Will continue to monitor pertinent labs and reassess nutrient need prn




-------------------------------------------------------------------------------

Addendum: 10/29/19 at 1220 by Tracy Mcgovern RD

-------------------------------------------------------------------------------

Amended: Links added.

## 2019-10-29 NOTE — NUR
Respiratory note:

UNABLE TO OBTAIN ABG AT THIS TIME STERILE ART LINE PROCEDURE IN PROGRESS.WILL TRY AGAIN AT A 
LATER TIME.

## 2019-10-29 NOTE — NUR
FAMILY VISIT

PT DAUGHTER AND SISTER TO UNIT. UPDATED ON PT CONDITION AND PLAN OF CARE. ALL QUESTIONS AND 
CONCERNS ADDRESSED. PT DAUGHTER WISHES TO CHANGE PASSWORD AND RESTRICT VISITING TO PASSWORD 
ONLY.  UPDATED.

## 2019-10-29 NOTE — NUR
WOUND CARE NOTE:

Wound care consult received from nursing for low Rambo score.  Patient is a 65yo male 
admitted for septic shock.  Patient with a history of HTN, NIDDM, HLD, osteoarthritis and 
COPD.  Patient is currently intubated and sedated.  Patient with no signs or symptoms of 
pain.  Last Rambo score is 11.  Patient with no open wounds noted.  Skin is intact.

RECOMMENDATIONS:

Dietary Consult; Turn q2hrs; Nursing to cleanse buttocks/jordi area with mild soap and water, 
pat dry, apply ZGUARD BID/PRN soiling, may place sacral foam to prevent friction/shear; 
wound care team to follow while intubated and Rambo <18.

## 2019-10-29 NOTE — NUR
OPEN

ASSUMED CARE OF MALE PT ORALLY INTUBATED. PT SEDATED ON VERSED GTT 15 MG/HR, AND FENTANYL 
 MCG/HR. COUGH AND GAG POSITIVE. PT GRIMACE DURING TURNING AND ORAL CARE, OTHERWISE 
NON RESPONSIVE. SR ON CARDIAC MONITOR. LEVOPHED GTT INFUSING AT 20 MCG/MIN, AND VASOPRESSIN 
GTT 0.03 UNIT/MIN. PULSES WEAK/PALPABLE. R. FEMORAL ARIEL IN PLACE WITH GOOD WAVEFORM 
OBSERVED. R. IJ TLC CATH IN PLACE HOOKED UP TO CVP READINGS. CVP 10. GOOD WAVE FORM 
OBSERVED. ALL PORTS PATENT. L. EJ 18 G IV S/L. B&P. NGT TO L. NARE CLAMPED. PLACEMENT 
VERIFIED. LAGUNA TO GRAVITY DRAINING LIGHT MARISSA URINE. NO SKIN BREAKDOWN OBSERVED. NO 
INDICATION OF PAIN OBSERVED. BED IN LOWEST LOCKED POSITION. HOB ELEVATED 35 DEGREES. PT IN 
FULL VIEW OF RN STATION. WILL CONTINUE TO MONITOR.

## 2019-10-29 NOTE — NUR
MIAH CHE CALLED BACK

UPDATED REGARDING PT HR AT TIMES 'S. COARSE CRACKLES AUSCULTATED. ORDER RECEIVED TO 
OBTAIN CHEST XRAY.

## 2019-10-30 VITALS — DIASTOLIC BLOOD PRESSURE: 65 MMHG | SYSTOLIC BLOOD PRESSURE: 102 MMHG

## 2019-10-30 VITALS — DIASTOLIC BLOOD PRESSURE: 80 MMHG | SYSTOLIC BLOOD PRESSURE: 132 MMHG

## 2019-10-30 VITALS — SYSTOLIC BLOOD PRESSURE: 117 MMHG | DIASTOLIC BLOOD PRESSURE: 69 MMHG

## 2019-10-30 VITALS — SYSTOLIC BLOOD PRESSURE: 104 MMHG | DIASTOLIC BLOOD PRESSURE: 69 MMHG

## 2019-10-30 VITALS — SYSTOLIC BLOOD PRESSURE: 127 MMHG | DIASTOLIC BLOOD PRESSURE: 84 MMHG

## 2019-10-30 VITALS — SYSTOLIC BLOOD PRESSURE: 94 MMHG | DIASTOLIC BLOOD PRESSURE: 60 MMHG

## 2019-10-30 VITALS — SYSTOLIC BLOOD PRESSURE: 126 MMHG | DIASTOLIC BLOOD PRESSURE: 86 MMHG

## 2019-10-30 VITALS — SYSTOLIC BLOOD PRESSURE: 120 MMHG | DIASTOLIC BLOOD PRESSURE: 71 MMHG

## 2019-10-30 VITALS — DIASTOLIC BLOOD PRESSURE: 71 MMHG | SYSTOLIC BLOOD PRESSURE: 119 MMHG

## 2019-10-30 VITALS — DIASTOLIC BLOOD PRESSURE: 66 MMHG | SYSTOLIC BLOOD PRESSURE: 106 MMHG

## 2019-10-30 VITALS — SYSTOLIC BLOOD PRESSURE: 133 MMHG | DIASTOLIC BLOOD PRESSURE: 83 MMHG

## 2019-10-30 VITALS — DIASTOLIC BLOOD PRESSURE: 67 MMHG | SYSTOLIC BLOOD PRESSURE: 103 MMHG

## 2019-10-30 VITALS — DIASTOLIC BLOOD PRESSURE: 68 MMHG | SYSTOLIC BLOOD PRESSURE: 104 MMHG

## 2019-10-30 VITALS — DIASTOLIC BLOOD PRESSURE: 60 MMHG | SYSTOLIC BLOOD PRESSURE: 97 MMHG

## 2019-10-30 VITALS — SYSTOLIC BLOOD PRESSURE: 97 MMHG | DIASTOLIC BLOOD PRESSURE: 66 MMHG

## 2019-10-30 VITALS — SYSTOLIC BLOOD PRESSURE: 102 MMHG | DIASTOLIC BLOOD PRESSURE: 64 MMHG

## 2019-10-30 VITALS — DIASTOLIC BLOOD PRESSURE: 69 MMHG | SYSTOLIC BLOOD PRESSURE: 100 MMHG

## 2019-10-30 VITALS — DIASTOLIC BLOOD PRESSURE: 71 MMHG | SYSTOLIC BLOOD PRESSURE: 115 MMHG

## 2019-10-30 VITALS — DIASTOLIC BLOOD PRESSURE: 68 MMHG | SYSTOLIC BLOOD PRESSURE: 116 MMHG

## 2019-10-30 VITALS — DIASTOLIC BLOOD PRESSURE: 72 MMHG | SYSTOLIC BLOOD PRESSURE: 132 MMHG

## 2019-10-30 VITALS — DIASTOLIC BLOOD PRESSURE: 81 MMHG | SYSTOLIC BLOOD PRESSURE: 124 MMHG

## 2019-10-30 VITALS — DIASTOLIC BLOOD PRESSURE: 73 MMHG | SYSTOLIC BLOOD PRESSURE: 121 MMHG

## 2019-10-30 VITALS — DIASTOLIC BLOOD PRESSURE: 63 MMHG | SYSTOLIC BLOOD PRESSURE: 98 MMHG

## 2019-10-30 VITALS — SYSTOLIC BLOOD PRESSURE: 98 MMHG | DIASTOLIC BLOOD PRESSURE: 67 MMHG

## 2019-10-30 VITALS — DIASTOLIC BLOOD PRESSURE: 66 MMHG | SYSTOLIC BLOOD PRESSURE: 107 MMHG

## 2019-10-30 VITALS — SYSTOLIC BLOOD PRESSURE: 124 MMHG | DIASTOLIC BLOOD PRESSURE: 66 MMHG

## 2019-10-30 VITALS — SYSTOLIC BLOOD PRESSURE: 115 MMHG | DIASTOLIC BLOOD PRESSURE: 72 MMHG

## 2019-10-30 VITALS — SYSTOLIC BLOOD PRESSURE: 108 MMHG | DIASTOLIC BLOOD PRESSURE: 70 MMHG

## 2019-10-30 VITALS — DIASTOLIC BLOOD PRESSURE: 71 MMHG | SYSTOLIC BLOOD PRESSURE: 111 MMHG

## 2019-10-30 VITALS — SYSTOLIC BLOOD PRESSURE: 95 MMHG | DIASTOLIC BLOOD PRESSURE: 66 MMHG

## 2019-10-30 VITALS — SYSTOLIC BLOOD PRESSURE: 113 MMHG | DIASTOLIC BLOOD PRESSURE: 68 MMHG

## 2019-10-30 VITALS — SYSTOLIC BLOOD PRESSURE: 111 MMHG | DIASTOLIC BLOOD PRESSURE: 72 MMHG

## 2019-10-30 VITALS — SYSTOLIC BLOOD PRESSURE: 105 MMHG | DIASTOLIC BLOOD PRESSURE: 67 MMHG

## 2019-10-30 VITALS — SYSTOLIC BLOOD PRESSURE: 107 MMHG | DIASTOLIC BLOOD PRESSURE: 71 MMHG

## 2019-10-30 VITALS — DIASTOLIC BLOOD PRESSURE: 70 MMHG | SYSTOLIC BLOOD PRESSURE: 109 MMHG

## 2019-10-30 VITALS — DIASTOLIC BLOOD PRESSURE: 71 MMHG | SYSTOLIC BLOOD PRESSURE: 109 MMHG

## 2019-10-30 VITALS — SYSTOLIC BLOOD PRESSURE: 103 MMHG | DIASTOLIC BLOOD PRESSURE: 69 MMHG

## 2019-10-30 VITALS — SYSTOLIC BLOOD PRESSURE: 100 MMHG | DIASTOLIC BLOOD PRESSURE: 64 MMHG

## 2019-10-30 VITALS — DIASTOLIC BLOOD PRESSURE: 70 MMHG | SYSTOLIC BLOOD PRESSURE: 114 MMHG

## 2019-10-30 VITALS — DIASTOLIC BLOOD PRESSURE: 78 MMHG | SYSTOLIC BLOOD PRESSURE: 122 MMHG

## 2019-10-30 VITALS — SYSTOLIC BLOOD PRESSURE: 128 MMHG | DIASTOLIC BLOOD PRESSURE: 76 MMHG

## 2019-10-30 VITALS — DIASTOLIC BLOOD PRESSURE: 63 MMHG | SYSTOLIC BLOOD PRESSURE: 104 MMHG

## 2019-10-30 VITALS — SYSTOLIC BLOOD PRESSURE: 121 MMHG | DIASTOLIC BLOOD PRESSURE: 68 MMHG

## 2019-10-30 VITALS — DIASTOLIC BLOOD PRESSURE: 70 MMHG | SYSTOLIC BLOOD PRESSURE: 128 MMHG

## 2019-10-30 VITALS — DIASTOLIC BLOOD PRESSURE: 73 MMHG | SYSTOLIC BLOOD PRESSURE: 122 MMHG

## 2019-10-30 VITALS — DIASTOLIC BLOOD PRESSURE: 65 MMHG | SYSTOLIC BLOOD PRESSURE: 101 MMHG

## 2019-10-30 VITALS — DIASTOLIC BLOOD PRESSURE: 67 MMHG | SYSTOLIC BLOOD PRESSURE: 104 MMHG

## 2019-10-30 VITALS — SYSTOLIC BLOOD PRESSURE: 128 MMHG | DIASTOLIC BLOOD PRESSURE: 72 MMHG

## 2019-10-30 VITALS — DIASTOLIC BLOOD PRESSURE: 72 MMHG | SYSTOLIC BLOOD PRESSURE: 112 MMHG

## 2019-10-30 VITALS — DIASTOLIC BLOOD PRESSURE: 62 MMHG | SYSTOLIC BLOOD PRESSURE: 94 MMHG

## 2019-10-30 VITALS — SYSTOLIC BLOOD PRESSURE: 125 MMHG | DIASTOLIC BLOOD PRESSURE: 78 MMHG

## 2019-10-30 VITALS — SYSTOLIC BLOOD PRESSURE: 116 MMHG | DIASTOLIC BLOOD PRESSURE: 76 MMHG

## 2019-10-30 VITALS — SYSTOLIC BLOOD PRESSURE: 129 MMHG | DIASTOLIC BLOOD PRESSURE: 76 MMHG

## 2019-10-30 VITALS — SYSTOLIC BLOOD PRESSURE: 97 MMHG | DIASTOLIC BLOOD PRESSURE: 63 MMHG

## 2019-10-30 VITALS — DIASTOLIC BLOOD PRESSURE: 79 MMHG | SYSTOLIC BLOOD PRESSURE: 129 MMHG

## 2019-10-30 VITALS — DIASTOLIC BLOOD PRESSURE: 68 MMHG | SYSTOLIC BLOOD PRESSURE: 110 MMHG

## 2019-10-30 VITALS — SYSTOLIC BLOOD PRESSURE: 95 MMHG | DIASTOLIC BLOOD PRESSURE: 64 MMHG

## 2019-10-30 VITALS — DIASTOLIC BLOOD PRESSURE: 82 MMHG | SYSTOLIC BLOOD PRESSURE: 129 MMHG

## 2019-10-30 VITALS — SYSTOLIC BLOOD PRESSURE: 117 MMHG | DIASTOLIC BLOOD PRESSURE: 74 MMHG

## 2019-10-30 VITALS — SYSTOLIC BLOOD PRESSURE: 104 MMHG | DIASTOLIC BLOOD PRESSURE: 71 MMHG

## 2019-10-30 VITALS — DIASTOLIC BLOOD PRESSURE: 78 MMHG | SYSTOLIC BLOOD PRESSURE: 129 MMHG

## 2019-10-30 VITALS — DIASTOLIC BLOOD PRESSURE: 79 MMHG | SYSTOLIC BLOOD PRESSURE: 124 MMHG

## 2019-10-30 VITALS — DIASTOLIC BLOOD PRESSURE: 74 MMHG | SYSTOLIC BLOOD PRESSURE: 119 MMHG

## 2019-10-30 VITALS — DIASTOLIC BLOOD PRESSURE: 66 MMHG | SYSTOLIC BLOOD PRESSURE: 97 MMHG

## 2019-10-30 VITALS — SYSTOLIC BLOOD PRESSURE: 124 MMHG | DIASTOLIC BLOOD PRESSURE: 75 MMHG

## 2019-10-30 VITALS — DIASTOLIC BLOOD PRESSURE: 67 MMHG | SYSTOLIC BLOOD PRESSURE: 107 MMHG

## 2019-10-30 VITALS — DIASTOLIC BLOOD PRESSURE: 73 MMHG | SYSTOLIC BLOOD PRESSURE: 127 MMHG

## 2019-10-30 VITALS — SYSTOLIC BLOOD PRESSURE: 125 MMHG | DIASTOLIC BLOOD PRESSURE: 80 MMHG

## 2019-10-30 VITALS — DIASTOLIC BLOOD PRESSURE: 72 MMHG | SYSTOLIC BLOOD PRESSURE: 123 MMHG

## 2019-10-30 VITALS — DIASTOLIC BLOOD PRESSURE: 77 MMHG | SYSTOLIC BLOOD PRESSURE: 123 MMHG

## 2019-10-30 VITALS — SYSTOLIC BLOOD PRESSURE: 122 MMHG | DIASTOLIC BLOOD PRESSURE: 74 MMHG

## 2019-10-30 VITALS — SYSTOLIC BLOOD PRESSURE: 125 MMHG | DIASTOLIC BLOOD PRESSURE: 70 MMHG

## 2019-10-30 VITALS — DIASTOLIC BLOOD PRESSURE: 69 MMHG | SYSTOLIC BLOOD PRESSURE: 130 MMHG

## 2019-10-30 VITALS — SYSTOLIC BLOOD PRESSURE: 95 MMHG | DIASTOLIC BLOOD PRESSURE: 60 MMHG

## 2019-10-30 VITALS — DIASTOLIC BLOOD PRESSURE: 68 MMHG | SYSTOLIC BLOOD PRESSURE: 111 MMHG

## 2019-10-30 VITALS — DIASTOLIC BLOOD PRESSURE: 85 MMHG | SYSTOLIC BLOOD PRESSURE: 133 MMHG

## 2019-10-30 VITALS — SYSTOLIC BLOOD PRESSURE: 125 MMHG | DIASTOLIC BLOOD PRESSURE: 82 MMHG

## 2019-10-30 VITALS — SYSTOLIC BLOOD PRESSURE: 101 MMHG | DIASTOLIC BLOOD PRESSURE: 67 MMHG

## 2019-10-30 VITALS — SYSTOLIC BLOOD PRESSURE: 121 MMHG | DIASTOLIC BLOOD PRESSURE: 78 MMHG

## 2019-10-30 VITALS — DIASTOLIC BLOOD PRESSURE: 81 MMHG | SYSTOLIC BLOOD PRESSURE: 132 MMHG

## 2019-10-30 VITALS — DIASTOLIC BLOOD PRESSURE: 69 MMHG | SYSTOLIC BLOOD PRESSURE: 102 MMHG

## 2019-10-30 VITALS — SYSTOLIC BLOOD PRESSURE: 103 MMHG | DIASTOLIC BLOOD PRESSURE: 67 MMHG

## 2019-10-30 VITALS — DIASTOLIC BLOOD PRESSURE: 72 MMHG | SYSTOLIC BLOOD PRESSURE: 121 MMHG

## 2019-10-30 VITALS — SYSTOLIC BLOOD PRESSURE: 121 MMHG | DIASTOLIC BLOOD PRESSURE: 74 MMHG

## 2019-10-30 VITALS — SYSTOLIC BLOOD PRESSURE: 112 MMHG | DIASTOLIC BLOOD PRESSURE: 75 MMHG

## 2019-10-30 VITALS — DIASTOLIC BLOOD PRESSURE: 71 MMHG | SYSTOLIC BLOOD PRESSURE: 116 MMHG

## 2019-10-30 VITALS — DIASTOLIC BLOOD PRESSURE: 64 MMHG | SYSTOLIC BLOOD PRESSURE: 110 MMHG

## 2019-10-30 VITALS — SYSTOLIC BLOOD PRESSURE: 118 MMHG | DIASTOLIC BLOOD PRESSURE: 72 MMHG

## 2019-10-30 VITALS — SYSTOLIC BLOOD PRESSURE: 104 MMHG | DIASTOLIC BLOOD PRESSURE: 66 MMHG

## 2019-10-30 VITALS — DIASTOLIC BLOOD PRESSURE: 64 MMHG | SYSTOLIC BLOOD PRESSURE: 99 MMHG

## 2019-10-30 VITALS — SYSTOLIC BLOOD PRESSURE: 103 MMHG | DIASTOLIC BLOOD PRESSURE: 70 MMHG

## 2019-10-30 VITALS — DIASTOLIC BLOOD PRESSURE: 67 MMHG | SYSTOLIC BLOOD PRESSURE: 101 MMHG

## 2019-10-30 VITALS — SYSTOLIC BLOOD PRESSURE: 110 MMHG | DIASTOLIC BLOOD PRESSURE: 61 MMHG

## 2019-10-30 VITALS — SYSTOLIC BLOOD PRESSURE: 104 MMHG | DIASTOLIC BLOOD PRESSURE: 63 MMHG

## 2019-10-30 VITALS — DIASTOLIC BLOOD PRESSURE: 72 MMHG | SYSTOLIC BLOOD PRESSURE: 125 MMHG

## 2019-10-30 VITALS — SYSTOLIC BLOOD PRESSURE: 112 MMHG | DIASTOLIC BLOOD PRESSURE: 68 MMHG

## 2019-10-30 VITALS — DIASTOLIC BLOOD PRESSURE: 72 MMHG | SYSTOLIC BLOOD PRESSURE: 116 MMHG

## 2019-10-30 VITALS — SYSTOLIC BLOOD PRESSURE: 107 MMHG | DIASTOLIC BLOOD PRESSURE: 72 MMHG

## 2019-10-30 LAB
ALBUMIN SERPL-MCNC: 2.1 G/DL (ref 3.4–5)
ALP SERPL-CCNC: 89 U/L (ref 45–117)
ALT SERPL-CCNC: 14 U/L (ref 16–61)
ANION GAP SERPL CALCULATED.3IONS-SCNC: 11 MMOL/L (ref 5–15)
BILIRUB SERPL-MCNC: 0.4 MG/DL (ref 0.2–1)
BUN SERPL-MCNC: 58 MG/DL (ref 7–18)
BUN/CREAT SERPL: 19.3
CALCIUM SERPL-MCNC: 7.3 MG/DL (ref 8.5–10.1)
CHLORIDE SERPL-SCNC: 100 MMOL/L (ref 98–107)
CO2 SERPL-SCNC: 25 MMOL/L (ref 21–32)
GLUCOSE SERPL-MCNC: 348 MG/DL (ref 74–106)
HCT VFR BLD AUTO: 38.5 % (ref 41–53)
HGB BLD-MCNC: 13.3 G/DL (ref 13.5–17.5)
MCH RBC QN AUTO: 32.4 PG (ref 28–32)
MCV RBC AUTO: 94 FL (ref 80–100)
NRBC BLD QL AUTO: 0 %
POTASSIUM SERPL-SCNC: 4.1 MMOL/L (ref 3.5–5.1)
PROT SERPL-MCNC: 6.5 G/DL (ref 6.4–8.2)
SODIUM SERPL-SCNC: 136 MMOL/L (ref 136–145)

## 2019-10-30 PROCEDURE — 04HK33Z INSERTION OF INFUSION DEVICE INTO RIGHT FEMORAL ARTERY, PERCUTANEOUS APPROACH: ICD-10-PCS | Performed by: INTERNAL MEDICINE

## 2019-10-30 RX ADMIN — PIPERACILLIN SODIUM AND TAZOBACTAM SODIUM SCH MLS/HR: .25; 2 INJECTION, POWDER, LYOPHILIZED, FOR SOLUTION INTRAVENOUS at 17:26

## 2019-10-30 RX ADMIN — PANTOPRAZOLE SODIUM SCH MG: 40 INJECTION, POWDER, FOR SOLUTION INTRAVENOUS at 09:43

## 2019-10-30 RX ADMIN — Medication SCH STRIP: at 00:20

## 2019-10-30 RX ADMIN — IPRATROPIUM BROMIDE SCH MG: 0.5 SOLUTION RESPIRATORY (INHALATION) at 00:57

## 2019-10-30 RX ADMIN — ENOXAPARIN SODIUM SCH MG: 30 INJECTION SUBCUTANEOUS at 09:44

## 2019-10-30 RX ADMIN — Medication SCH STRIP: at 17:35

## 2019-10-30 RX ADMIN — ALBUTEROL SULFATE SCH MG: 2.5 SOLUTION RESPIRATORY (INHALATION) at 18:14

## 2019-10-30 RX ADMIN — FENTANYL CITRATE SCH MLS/HR: 50 INJECTION, SOLUTION INTRAMUSCULAR; INTRAVENOUS at 17:12

## 2019-10-30 RX ADMIN — MIDAZOLAM SCH MLS/HR: 5 INJECTION, SOLUTION INTRAMUSCULAR; INTRAVENOUS at 20:42

## 2019-10-30 RX ADMIN — HUMAN INSULIN SCH UNITS: 100 INJECTION, SOLUTION SUBCUTANEOUS at 05:07

## 2019-10-30 RX ADMIN — Medication SCH STRIP: at 11:58

## 2019-10-30 RX ADMIN — MIDAZOLAM SCH MLS/HR: 5 INJECTION, SOLUTION INTRAMUSCULAR; INTRAVENOUS at 15:02

## 2019-10-30 RX ADMIN — PIPERACILLIN SODIUM AND TAZOBACTAM SODIUM SCH MLS/HR: .25; 2 INJECTION, POWDER, LYOPHILIZED, FOR SOLUTION INTRAVENOUS at 05:02

## 2019-10-30 RX ADMIN — LINEZOLID SCH MLS/HR: 600 INJECTION, SOLUTION INTRAVENOUS at 09:44

## 2019-10-30 RX ADMIN — PIPERACILLIN SODIUM AND TAZOBACTAM SODIUM SCH MLS/HR: .25; 2 INJECTION, POWDER, LYOPHILIZED, FOR SOLUTION INTRAVENOUS at 00:15

## 2019-10-30 RX ADMIN — HUMAN INSULIN SCH UNITS: 100 INJECTION, SOLUTION SUBCUTANEOUS at 23:56

## 2019-10-30 RX ADMIN — MIDAZOLAM SCH MLS/HR: 5 INJECTION, SOLUTION INTRAMUSCULAR; INTRAVENOUS at 10:54

## 2019-10-30 RX ADMIN — HUMAN INSULIN SCH UNITS: 100 INJECTION, SOLUTION SUBCUTANEOUS at 00:25

## 2019-10-30 RX ADMIN — HYDROCORTISONE SODIUM SUCCINATE SCH MG: 100 INJECTION, POWDER, FOR SOLUTION INTRAMUSCULAR; INTRAVENOUS at 00:13

## 2019-10-30 RX ADMIN — HYDROCORTISONE SODIUM SUCCINATE SCH MG: 100 INJECTION, POWDER, FOR SOLUTION INTRAMUSCULAR; INTRAVENOUS at 21:34

## 2019-10-30 RX ADMIN — PIPERACILLIN SODIUM AND TAZOBACTAM SODIUM SCH MLS/HR: .25; 2 INJECTION, POWDER, LYOPHILIZED, FOR SOLUTION INTRAVENOUS at 23:41

## 2019-10-30 RX ADMIN — SODIUM CHLORIDE SCH MG: 9 INJECTION, SOLUTION INTRAVENOUS at 09:43

## 2019-10-30 RX ADMIN — DEXTROSE SCH MLS/HR: 50 INJECTION, SOLUTION INTRAVENOUS at 09:23

## 2019-10-30 RX ADMIN — HUMAN INSULIN SCH UNITS: 100 INJECTION, SOLUTION SUBCUTANEOUS at 17:35

## 2019-10-30 RX ADMIN — IPRATROPIUM BROMIDE SCH MG: 0.5 SOLUTION RESPIRATORY (INHALATION) at 06:00

## 2019-10-30 RX ADMIN — Medication SCH STRIP: at 23:56

## 2019-10-30 RX ADMIN — HYDROCORTISONE SODIUM SUCCINATE SCH MG: 100 INJECTION, POWDER, FOR SOLUTION INTRAMUSCULAR; INTRAVENOUS at 11:57

## 2019-10-30 RX ADMIN — PIPERACILLIN SODIUM AND TAZOBACTAM SODIUM SCH MLS/HR: .25; 2 INJECTION, POWDER, LYOPHILIZED, FOR SOLUTION INTRAVENOUS at 11:57

## 2019-10-30 RX ADMIN — NOREPINEPHRINE BITARTRATE SCH MLS/HR: 1 INJECTION, SOLUTION, CONCENTRATE INTRAVENOUS at 09:24

## 2019-10-30 RX ADMIN — ALBUTEROL SULFATE SCH MG: 2.5 SOLUTION RESPIRATORY (INHALATION) at 11:19

## 2019-10-30 RX ADMIN — LINEZOLID SCH MLS/HR: 600 INJECTION, SOLUTION INTRAVENOUS at 21:34

## 2019-10-30 RX ADMIN — FENTANYL CITRATE SCH MLS/HR: 50 INJECTION, SOLUTION INTRAMUSCULAR; INTRAVENOUS at 05:02

## 2019-10-30 RX ADMIN — HUMAN INSULIN SCH UNITS: 100 INJECTION, SOLUTION SUBCUTANEOUS at 11:57

## 2019-10-30 RX ADMIN — ALBUTEROL SULFATE SCH MG: 2.5 SOLUTION RESPIRATORY (INHALATION) at 00:57

## 2019-10-30 RX ADMIN — Medication SCH STRIP: at 05:07

## 2019-10-30 RX ADMIN — IPRATROPIUM BROMIDE SCH MG: 0.5 SOLUTION RESPIRATORY (INHALATION) at 18:14

## 2019-10-30 RX ADMIN — HYDROCORTISONE SODIUM SUCCINATE SCH MG: 100 INJECTION, POWDER, FOR SOLUTION INTRAMUSCULAR; INTRAVENOUS at 05:02

## 2019-10-30 RX ADMIN — IPRATROPIUM BROMIDE SCH MG: 0.5 SOLUTION RESPIRATORY (INHALATION) at 11:19

## 2019-10-30 RX ADMIN — ALBUTEROL SULFATE SCH MG: 2.5 SOLUTION RESPIRATORY (INHALATION) at 06:00

## 2019-10-30 NOTE — NUR
Dr. Phillips at bedside:



Titrate off vasopressin before levophed, start Glucerna with goal of 30ml/hr.

## 2019-10-30 NOTE — NUR
Dr. Arceo at bedside: 



Obtain consent for possible bronchoalveolar Lavage, with possible lavage and possible 
washings.

## 2019-10-30 NOTE — NUR
Increased tube feeding to 20ml/hr, residuals 5cc. Patient tolerating tube feeding. Continue 
to monitor.

## 2019-10-30 NOTE — NUR
ETT PLACEMENT READING 7.5 CM ABOVE AUGUSTIN. ETT ADVANCED AND RESECURED AT 23 UPPER LIP FROM 
20. CUFF PRESSURE MEASURED AT 30.

## 2019-10-31 VITALS — SYSTOLIC BLOOD PRESSURE: 69 MMHG | DIASTOLIC BLOOD PRESSURE: 62 MMHG

## 2019-10-31 VITALS — SYSTOLIC BLOOD PRESSURE: 128 MMHG | DIASTOLIC BLOOD PRESSURE: 71 MMHG

## 2019-10-31 VITALS — SYSTOLIC BLOOD PRESSURE: 108 MMHG | DIASTOLIC BLOOD PRESSURE: 68 MMHG

## 2019-10-31 VITALS — SYSTOLIC BLOOD PRESSURE: 114 MMHG | DIASTOLIC BLOOD PRESSURE: 68 MMHG

## 2019-10-31 VITALS — SYSTOLIC BLOOD PRESSURE: 109 MMHG | DIASTOLIC BLOOD PRESSURE: 69 MMHG

## 2019-10-31 VITALS — DIASTOLIC BLOOD PRESSURE: 57 MMHG | SYSTOLIC BLOOD PRESSURE: 100 MMHG

## 2019-10-31 VITALS — SYSTOLIC BLOOD PRESSURE: 113 MMHG | DIASTOLIC BLOOD PRESSURE: 72 MMHG

## 2019-10-31 VITALS — DIASTOLIC BLOOD PRESSURE: 55 MMHG | SYSTOLIC BLOOD PRESSURE: 101 MMHG

## 2019-10-31 VITALS — SYSTOLIC BLOOD PRESSURE: 104 MMHG | DIASTOLIC BLOOD PRESSURE: 66 MMHG

## 2019-10-31 VITALS — DIASTOLIC BLOOD PRESSURE: 67 MMHG | SYSTOLIC BLOOD PRESSURE: 114 MMHG

## 2019-10-31 VITALS — DIASTOLIC BLOOD PRESSURE: 68 MMHG | SYSTOLIC BLOOD PRESSURE: 103 MMHG

## 2019-10-31 VITALS — SYSTOLIC BLOOD PRESSURE: 118 MMHG | DIASTOLIC BLOOD PRESSURE: 68 MMHG

## 2019-10-31 VITALS — SYSTOLIC BLOOD PRESSURE: 101 MMHG | DIASTOLIC BLOOD PRESSURE: 63 MMHG

## 2019-10-31 VITALS — DIASTOLIC BLOOD PRESSURE: 62 MMHG | SYSTOLIC BLOOD PRESSURE: 108 MMHG

## 2019-10-31 VITALS — SYSTOLIC BLOOD PRESSURE: 103 MMHG | DIASTOLIC BLOOD PRESSURE: 69 MMHG

## 2019-10-31 VITALS — DIASTOLIC BLOOD PRESSURE: 58 MMHG | SYSTOLIC BLOOD PRESSURE: 102 MMHG

## 2019-10-31 VITALS — DIASTOLIC BLOOD PRESSURE: 52 MMHG | SYSTOLIC BLOOD PRESSURE: 90 MMHG

## 2019-10-31 VITALS — SYSTOLIC BLOOD PRESSURE: 106 MMHG | DIASTOLIC BLOOD PRESSURE: 61 MMHG

## 2019-10-31 VITALS — DIASTOLIC BLOOD PRESSURE: 70 MMHG | SYSTOLIC BLOOD PRESSURE: 115 MMHG

## 2019-10-31 VITALS — DIASTOLIC BLOOD PRESSURE: 65 MMHG | SYSTOLIC BLOOD PRESSURE: 109 MMHG

## 2019-10-31 VITALS — DIASTOLIC BLOOD PRESSURE: 68 MMHG | SYSTOLIC BLOOD PRESSURE: 111 MMHG

## 2019-10-31 VITALS — SYSTOLIC BLOOD PRESSURE: 111 MMHG | DIASTOLIC BLOOD PRESSURE: 56 MMHG

## 2019-10-31 VITALS — SYSTOLIC BLOOD PRESSURE: 125 MMHG | DIASTOLIC BLOOD PRESSURE: 77 MMHG

## 2019-10-31 VITALS — SYSTOLIC BLOOD PRESSURE: 88 MMHG | DIASTOLIC BLOOD PRESSURE: 66 MMHG

## 2019-10-31 VITALS — DIASTOLIC BLOOD PRESSURE: 72 MMHG | SYSTOLIC BLOOD PRESSURE: 120 MMHG

## 2019-10-31 VITALS — DIASTOLIC BLOOD PRESSURE: 64 MMHG | SYSTOLIC BLOOD PRESSURE: 100 MMHG

## 2019-10-31 VITALS — SYSTOLIC BLOOD PRESSURE: 115 MMHG | DIASTOLIC BLOOD PRESSURE: 72 MMHG

## 2019-10-31 VITALS — DIASTOLIC BLOOD PRESSURE: 65 MMHG | SYSTOLIC BLOOD PRESSURE: 112 MMHG

## 2019-10-31 VITALS — SYSTOLIC BLOOD PRESSURE: 112 MMHG | DIASTOLIC BLOOD PRESSURE: 76 MMHG

## 2019-10-31 VITALS — SYSTOLIC BLOOD PRESSURE: 105 MMHG | DIASTOLIC BLOOD PRESSURE: 65 MMHG

## 2019-10-31 VITALS — DIASTOLIC BLOOD PRESSURE: 71 MMHG | SYSTOLIC BLOOD PRESSURE: 78 MMHG

## 2019-10-31 VITALS — SYSTOLIC BLOOD PRESSURE: 110 MMHG | DIASTOLIC BLOOD PRESSURE: 71 MMHG

## 2019-10-31 VITALS — DIASTOLIC BLOOD PRESSURE: 64 MMHG | SYSTOLIC BLOOD PRESSURE: 110 MMHG

## 2019-10-31 VITALS — SYSTOLIC BLOOD PRESSURE: 109 MMHG | DIASTOLIC BLOOD PRESSURE: 68 MMHG

## 2019-10-31 VITALS — DIASTOLIC BLOOD PRESSURE: 64 MMHG | SYSTOLIC BLOOD PRESSURE: 106 MMHG

## 2019-10-31 VITALS — SYSTOLIC BLOOD PRESSURE: 114 MMHG | DIASTOLIC BLOOD PRESSURE: 72 MMHG

## 2019-10-31 VITALS — DIASTOLIC BLOOD PRESSURE: 70 MMHG | SYSTOLIC BLOOD PRESSURE: 112 MMHG

## 2019-10-31 VITALS — DIASTOLIC BLOOD PRESSURE: 66 MMHG | SYSTOLIC BLOOD PRESSURE: 72 MMHG

## 2019-10-31 VITALS — SYSTOLIC BLOOD PRESSURE: 107 MMHG | DIASTOLIC BLOOD PRESSURE: 68 MMHG

## 2019-10-31 VITALS — SYSTOLIC BLOOD PRESSURE: 110 MMHG | DIASTOLIC BLOOD PRESSURE: 63 MMHG

## 2019-10-31 VITALS — DIASTOLIC BLOOD PRESSURE: 63 MMHG | SYSTOLIC BLOOD PRESSURE: 101 MMHG

## 2019-10-31 VITALS — SYSTOLIC BLOOD PRESSURE: 75 MMHG | DIASTOLIC BLOOD PRESSURE: 61 MMHG

## 2019-10-31 VITALS — DIASTOLIC BLOOD PRESSURE: 67 MMHG | SYSTOLIC BLOOD PRESSURE: 75 MMHG

## 2019-10-31 VITALS — DIASTOLIC BLOOD PRESSURE: 71 MMHG | SYSTOLIC BLOOD PRESSURE: 110 MMHG

## 2019-10-31 VITALS — DIASTOLIC BLOOD PRESSURE: 52 MMHG | SYSTOLIC BLOOD PRESSURE: 96 MMHG

## 2019-10-31 VITALS — DIASTOLIC BLOOD PRESSURE: 61 MMHG | SYSTOLIC BLOOD PRESSURE: 105 MMHG

## 2019-10-31 VITALS — SYSTOLIC BLOOD PRESSURE: 118 MMHG | DIASTOLIC BLOOD PRESSURE: 70 MMHG

## 2019-10-31 VITALS — SYSTOLIC BLOOD PRESSURE: 119 MMHG | DIASTOLIC BLOOD PRESSURE: 72 MMHG

## 2019-10-31 VITALS — SYSTOLIC BLOOD PRESSURE: 95 MMHG | DIASTOLIC BLOOD PRESSURE: 65 MMHG

## 2019-10-31 VITALS — DIASTOLIC BLOOD PRESSURE: 72 MMHG | SYSTOLIC BLOOD PRESSURE: 88 MMHG

## 2019-10-31 VITALS — SYSTOLIC BLOOD PRESSURE: 105 MMHG | DIASTOLIC BLOOD PRESSURE: 67 MMHG

## 2019-10-31 VITALS — SYSTOLIC BLOOD PRESSURE: 108 MMHG | DIASTOLIC BLOOD PRESSURE: 67 MMHG

## 2019-10-31 VITALS — SYSTOLIC BLOOD PRESSURE: 99 MMHG | DIASTOLIC BLOOD PRESSURE: 58 MMHG

## 2019-10-31 VITALS — DIASTOLIC BLOOD PRESSURE: 66 MMHG | SYSTOLIC BLOOD PRESSURE: 105 MMHG

## 2019-10-31 VITALS — SYSTOLIC BLOOD PRESSURE: 115 MMHG | DIASTOLIC BLOOD PRESSURE: 67 MMHG

## 2019-10-31 VITALS — DIASTOLIC BLOOD PRESSURE: 69 MMHG | SYSTOLIC BLOOD PRESSURE: 104 MMHG

## 2019-10-31 VITALS — SYSTOLIC BLOOD PRESSURE: 105 MMHG | DIASTOLIC BLOOD PRESSURE: 66 MMHG

## 2019-10-31 VITALS — DIASTOLIC BLOOD PRESSURE: 62 MMHG | SYSTOLIC BLOOD PRESSURE: 109 MMHG

## 2019-10-31 VITALS — SYSTOLIC BLOOD PRESSURE: 117 MMHG | DIASTOLIC BLOOD PRESSURE: 73 MMHG

## 2019-10-31 VITALS — SYSTOLIC BLOOD PRESSURE: 101 MMHG | DIASTOLIC BLOOD PRESSURE: 61 MMHG

## 2019-10-31 VITALS — DIASTOLIC BLOOD PRESSURE: 75 MMHG | SYSTOLIC BLOOD PRESSURE: 117 MMHG

## 2019-10-31 VITALS — DIASTOLIC BLOOD PRESSURE: 73 MMHG | SYSTOLIC BLOOD PRESSURE: 115 MMHG

## 2019-10-31 VITALS — SYSTOLIC BLOOD PRESSURE: 109 MMHG | DIASTOLIC BLOOD PRESSURE: 66 MMHG

## 2019-10-31 VITALS — DIASTOLIC BLOOD PRESSURE: 71 MMHG | SYSTOLIC BLOOD PRESSURE: 114 MMHG

## 2019-10-31 VITALS — DIASTOLIC BLOOD PRESSURE: 69 MMHG | SYSTOLIC BLOOD PRESSURE: 108 MMHG

## 2019-10-31 VITALS — DIASTOLIC BLOOD PRESSURE: 68 MMHG | SYSTOLIC BLOOD PRESSURE: 118 MMHG

## 2019-10-31 VITALS — DIASTOLIC BLOOD PRESSURE: 61 MMHG | SYSTOLIC BLOOD PRESSURE: 101 MMHG

## 2019-10-31 VITALS — SYSTOLIC BLOOD PRESSURE: 116 MMHG | DIASTOLIC BLOOD PRESSURE: 68 MMHG

## 2019-10-31 VITALS — DIASTOLIC BLOOD PRESSURE: 69 MMHG | SYSTOLIC BLOOD PRESSURE: 106 MMHG

## 2019-10-31 VITALS — SYSTOLIC BLOOD PRESSURE: 97 MMHG | DIASTOLIC BLOOD PRESSURE: 62 MMHG

## 2019-10-31 VITALS — DIASTOLIC BLOOD PRESSURE: 70 MMHG | SYSTOLIC BLOOD PRESSURE: 116 MMHG

## 2019-10-31 VITALS — SYSTOLIC BLOOD PRESSURE: 117 MMHG | DIASTOLIC BLOOD PRESSURE: 72 MMHG

## 2019-10-31 VITALS — SYSTOLIC BLOOD PRESSURE: 123 MMHG | DIASTOLIC BLOOD PRESSURE: 74 MMHG

## 2019-10-31 VITALS — SYSTOLIC BLOOD PRESSURE: 133 MMHG | DIASTOLIC BLOOD PRESSURE: 74 MMHG

## 2019-10-31 VITALS — SYSTOLIC BLOOD PRESSURE: 128 MMHG | DIASTOLIC BLOOD PRESSURE: 73 MMHG

## 2019-10-31 VITALS — SYSTOLIC BLOOD PRESSURE: 106 MMHG | DIASTOLIC BLOOD PRESSURE: 64 MMHG

## 2019-10-31 VITALS — SYSTOLIC BLOOD PRESSURE: 100 MMHG | DIASTOLIC BLOOD PRESSURE: 62 MMHG

## 2019-10-31 VITALS — SYSTOLIC BLOOD PRESSURE: 106 MMHG | DIASTOLIC BLOOD PRESSURE: 58 MMHG

## 2019-10-31 VITALS — SYSTOLIC BLOOD PRESSURE: 115 MMHG | DIASTOLIC BLOOD PRESSURE: 71 MMHG

## 2019-10-31 VITALS — SYSTOLIC BLOOD PRESSURE: 107 MMHG | DIASTOLIC BLOOD PRESSURE: 69 MMHG

## 2019-10-31 VITALS — SYSTOLIC BLOOD PRESSURE: 123 MMHG | DIASTOLIC BLOOD PRESSURE: 82 MMHG

## 2019-10-31 VITALS — SYSTOLIC BLOOD PRESSURE: 114 MMHG | DIASTOLIC BLOOD PRESSURE: 69 MMHG

## 2019-10-31 VITALS — SYSTOLIC BLOOD PRESSURE: 111 MMHG | DIASTOLIC BLOOD PRESSURE: 70 MMHG

## 2019-10-31 VITALS — DIASTOLIC BLOOD PRESSURE: 58 MMHG | SYSTOLIC BLOOD PRESSURE: 112 MMHG

## 2019-10-31 VITALS — SYSTOLIC BLOOD PRESSURE: 80 MMHG | DIASTOLIC BLOOD PRESSURE: 69 MMHG

## 2019-10-31 VITALS — SYSTOLIC BLOOD PRESSURE: 116 MMHG | DIASTOLIC BLOOD PRESSURE: 73 MMHG

## 2019-10-31 VITALS — SYSTOLIC BLOOD PRESSURE: 121 MMHG | DIASTOLIC BLOOD PRESSURE: 69 MMHG

## 2019-10-31 VITALS — DIASTOLIC BLOOD PRESSURE: 61 MMHG | SYSTOLIC BLOOD PRESSURE: 104 MMHG

## 2019-10-31 VITALS — DIASTOLIC BLOOD PRESSURE: 75 MMHG | SYSTOLIC BLOOD PRESSURE: 115 MMHG

## 2019-10-31 VITALS — SYSTOLIC BLOOD PRESSURE: 70 MMHG | DIASTOLIC BLOOD PRESSURE: 62 MMHG

## 2019-10-31 VITALS — SYSTOLIC BLOOD PRESSURE: 80 MMHG | DIASTOLIC BLOOD PRESSURE: 59 MMHG

## 2019-10-31 VITALS — SYSTOLIC BLOOD PRESSURE: 111 MMHG | DIASTOLIC BLOOD PRESSURE: 68 MMHG

## 2019-10-31 VITALS — SYSTOLIC BLOOD PRESSURE: 103 MMHG | DIASTOLIC BLOOD PRESSURE: 56 MMHG

## 2019-10-31 VITALS — DIASTOLIC BLOOD PRESSURE: 66 MMHG | SYSTOLIC BLOOD PRESSURE: 115 MMHG

## 2019-10-31 VITALS — DIASTOLIC BLOOD PRESSURE: 71 MMHG | SYSTOLIC BLOOD PRESSURE: 108 MMHG

## 2019-10-31 VITALS — SYSTOLIC BLOOD PRESSURE: 116 MMHG | DIASTOLIC BLOOD PRESSURE: 67 MMHG

## 2019-10-31 VITALS — DIASTOLIC BLOOD PRESSURE: 57 MMHG | SYSTOLIC BLOOD PRESSURE: 66 MMHG

## 2019-10-31 VITALS — DIASTOLIC BLOOD PRESSURE: 66 MMHG | SYSTOLIC BLOOD PRESSURE: 111 MMHG

## 2019-10-31 VITALS — SYSTOLIC BLOOD PRESSURE: 115 MMHG | DIASTOLIC BLOOD PRESSURE: 70 MMHG

## 2019-10-31 VITALS — DIASTOLIC BLOOD PRESSURE: 71 MMHG | SYSTOLIC BLOOD PRESSURE: 111 MMHG

## 2019-10-31 VITALS — DIASTOLIC BLOOD PRESSURE: 67 MMHG | SYSTOLIC BLOOD PRESSURE: 110 MMHG

## 2019-10-31 LAB
ANION GAP SERPL CALCULATED.3IONS-SCNC: 8 MMOL/L (ref 5–15)
BUN SERPL-MCNC: 50 MG/DL (ref 7–18)
BUN/CREAT SERPL: 28.6
CALCIUM SERPL-MCNC: 8.1 MG/DL (ref 8.5–10.1)
CHLORIDE SERPL-SCNC: 104 MMOL/L (ref 98–107)
CO2 SERPL-SCNC: 28 MMOL/L (ref 21–32)
GLUCOSE SERPL-MCNC: 266 MG/DL (ref 74–106)
HCT VFR BLD AUTO: 35.1 % (ref 41–53)
HGB BLD-MCNC: 12.2 G/DL (ref 13.5–17.5)
INR PPP: 1.04 (ref 0.9–1.15)
MCH RBC QN AUTO: 32.1 PG (ref 28–32)
MCV RBC AUTO: 92.7 FL (ref 80–100)
NRBC BLD QL AUTO: 0 %
POTASSIUM SERPL-SCNC: 3.4 MMOL/L (ref 3.5–5.1)
SODIUM SERPL-SCNC: 140 MMOL/L (ref 136–145)

## 2019-10-31 PROCEDURE — 0BD38ZX EXTRACTION OF RIGHT MAIN BRONCHUS, VIA NATURAL OR ARTIFICIAL OPENING ENDOSCOPIC, DIAGNOSTIC: ICD-10-PCS | Performed by: INTERNAL MEDICINE

## 2019-10-31 PROCEDURE — 0B9D8ZX DRAINAGE OF RIGHT MIDDLE LUNG LOBE, VIA NATURAL OR ARTIFICIAL OPENING ENDOSCOPIC, DIAGNOSTIC: ICD-10-PCS | Performed by: INTERNAL MEDICINE

## 2019-10-31 PROCEDURE — 0B9F8ZX DRAINAGE OF RIGHT LOWER LUNG LOBE, VIA NATURAL OR ARTIFICIAL OPENING ENDOSCOPIC, DIAGNOSTIC: ICD-10-PCS | Performed by: INTERNAL MEDICINE

## 2019-10-31 RX ADMIN — POTASSIUM CHLORIDE SCH MLS/HR: 200 INJECTION, SOLUTION INTRAVENOUS at 12:17

## 2019-10-31 RX ADMIN — Medication SCH STRIP: at 06:08

## 2019-10-31 RX ADMIN — LINEZOLID SCH MLS/HR: 600 INJECTION, SOLUTION INTRAVENOUS at 21:54

## 2019-10-31 RX ADMIN — IPRATROPIUM BROMIDE SCH MG: 0.5 SOLUTION RESPIRATORY (INHALATION) at 18:37

## 2019-10-31 RX ADMIN — PIPERACILLIN SODIUM AND TAZOBACTAM SODIUM SCH MLS/HR: .25; 2 INJECTION, POWDER, LYOPHILIZED, FOR SOLUTION INTRAVENOUS at 06:06

## 2019-10-31 RX ADMIN — FENTANYL CITRATE SCH MLS/HR: 50 INJECTION, SOLUTION INTRAMUSCULAR; INTRAVENOUS at 06:07

## 2019-10-31 RX ADMIN — ALBUTEROL SULFATE SCH MG: 2.5 SOLUTION RESPIRATORY (INHALATION) at 18:38

## 2019-10-31 RX ADMIN — Medication SCH STRIP: at 12:16

## 2019-10-31 RX ADMIN — MIDAZOLAM SCH MLS/HR: 5 INJECTION, SOLUTION INTRAMUSCULAR; INTRAVENOUS at 04:04

## 2019-10-31 RX ADMIN — ALBUTEROL SULFATE SCH MG: 2.5 SOLUTION RESPIRATORY (INHALATION) at 05:40

## 2019-10-31 RX ADMIN — HYDROCORTISONE SODIUM SUCCINATE SCH MG: 100 INJECTION, POWDER, FOR SOLUTION INTRAMUSCULAR; INTRAVENOUS at 13:33

## 2019-10-31 RX ADMIN — PIPERACILLIN SODIUM AND TAZOBACTAM SODIUM SCH MLS/HR: .25; 2 INJECTION, POWDER, LYOPHILIZED, FOR SOLUTION INTRAVENOUS at 12:16

## 2019-10-31 RX ADMIN — HUMAN INSULIN SCH UNITS: 100 INJECTION, SOLUTION SUBCUTANEOUS at 17:44

## 2019-10-31 RX ADMIN — POTASSIUM CHLORIDE SCH MLS/HR: 200 INJECTION, SOLUTION INTRAVENOUS at 11:08

## 2019-10-31 RX ADMIN — Medication SCH STRIP: at 17:40

## 2019-10-31 RX ADMIN — HUMAN INSULIN SCH UNITS: 100 INJECTION, SOLUTION SUBCUTANEOUS at 12:16

## 2019-10-31 RX ADMIN — LINEZOLID SCH MLS/HR: 600 INJECTION, SOLUTION INTRAVENOUS at 09:42

## 2019-10-31 RX ADMIN — ALBUTEROL SULFATE SCH MG: 2.5 SOLUTION RESPIRATORY (INHALATION) at 11:34

## 2019-10-31 RX ADMIN — IPRATROPIUM BROMIDE SCH MG: 0.5 SOLUTION RESPIRATORY (INHALATION) at 11:34

## 2019-10-31 RX ADMIN — PANTOPRAZOLE SODIUM SCH MG: 40 INJECTION, POWDER, FOR SOLUTION INTRAVENOUS at 09:41

## 2019-10-31 RX ADMIN — ENOXAPARIN SODIUM SCH MG: 30 INJECTION SUBCUTANEOUS at 09:42

## 2019-10-31 RX ADMIN — SODIUM CHLORIDE SCH MG: 9 INJECTION, SOLUTION INTRAVENOUS at 09:41

## 2019-10-31 RX ADMIN — MIDAZOLAM SCH MLS/HR: 5 INJECTION, SOLUTION INTRAMUSCULAR; INTRAVENOUS at 11:07

## 2019-10-31 RX ADMIN — IPRATROPIUM BROMIDE SCH MG: 0.5 SOLUTION RESPIRATORY (INHALATION) at 00:15

## 2019-10-31 RX ADMIN — IPRATROPIUM BROMIDE SCH MG: 0.5 SOLUTION RESPIRATORY (INHALATION) at 05:40

## 2019-10-31 RX ADMIN — PIPERACILLIN SODIUM AND TAZOBACTAM SODIUM SCH MLS/HR: .25; 2 INJECTION, POWDER, LYOPHILIZED, FOR SOLUTION INTRAVENOUS at 17:40

## 2019-10-31 RX ADMIN — DEXTROSE SCH MLS/HR: 50 INJECTION, SOLUTION INTRAVENOUS at 10:15

## 2019-10-31 RX ADMIN — NOREPINEPHRINE BITARTRATE SCH MLS/HR: 1 INJECTION, SOLUTION, CONCENTRATE INTRAVENOUS at 09:02

## 2019-10-31 RX ADMIN — HYDROCORTISONE SODIUM SUCCINATE SCH MG: 100 INJECTION, POWDER, FOR SOLUTION INTRAMUSCULAR; INTRAVENOUS at 06:06

## 2019-10-31 RX ADMIN — HUMAN INSULIN SCH UNITS: 100 INJECTION, SOLUTION SUBCUTANEOUS at 06:08

## 2019-10-31 RX ADMIN — ACETAMINOPHEN PRN MG: 500 TABLET ORAL at 17:45

## 2019-10-31 RX ADMIN — ALBUTEROL SULFATE SCH MG: 2.5 SOLUTION RESPIRATORY (INHALATION) at 00:16

## 2019-10-31 NOTE — NUR
Sedation increased

Patient is restless, trying to pull put ett, not following commands. sedation increased to 
keep patient calm.

## 2019-10-31 NOTE — NUR
Dr. Phillips at bedside: 



Okay to discontinue A-line, steroid dicontinued, ask Dr. Arceo regarding peep ... okay to 
decrease.

## 2019-10-31 NOTE — NUR
SEDATION VACATION HELD

PATIENT IS RESTLESS SO SEDATION INCREASED.

-------------------------------------------------------------------------------

Addendum: 11/01/19 at 6338 by Barbara Smith RN

-------------------------------------------------------------------------------

Amended: Links added.

## 2019-10-31 NOTE — NUR
Nutrition Follow-up Notes



Wt.: 96.6 kg



Pt's intubated, sedated, no immediate family member at bedside when rounded earlier. Pt's 
currently NPO with EN support of Glucerna 1.2 [place] @ 30 ml/hr providing 864 kcal, 43 gms pro 
and 580 ml free water. Pt with inadequate EN support d/t low initiation rate delivery of 
concentrated formula aeb current EN infusion meets  36-39% of est caloric needs and 45-57% 
of est protein needs. Noted pt's for active Wound consult. 



Est. Needs BW 94 [id number] kcal (23-25 kcal/kgBW), 75-94 gms pro (0.8-1.0 gms/kgBW r/t 
elev RFT). Will continue to monitor pertinent labs and reassess nutrient need prn



Labs: BUN 50 H, CREAT 1.75 H,  H, CA 8.1 L



Skin: Rambo scale 11, high risk, skin intact per RN doc

 

GI: Pt has no BM reported per RN documentation. 



PES: 

Impaired swallowing r.t current medical condition aeb pt`s intubated sedated with order of 
NPO

Altered nutrition related lab values r/t current/chronic medical condition aeb 
hyperglycemia, elev RFT mild hypoalb

 

Will continue to monitor NPO status, EN tolerance, skin status, pertinent labs and weight 
trend. F/u in 2 to 3 days.

Rec.: 1.) Advance EN support with Glucerna @ 75 ml/hr. 2) refer to CDE on DC. 3) advance 
diet as medically feasible. 4) continue current plan of care

## 2019-10-31 NOTE — NUR
CHG bath given and groomed facial hair. 

-------------------------------------------------------------------------------

Signed:    10/31/19 at 0922 by Nataliia Hernandez RN

-------------------------------------------------------------------------------

## 2019-10-31 NOTE — NUR
A Line Dressing Changes

A line dressing change done with a sterile technique.  Cleansed with chloraprep 
scrub/betadine, applied bio-patch.  Occlusive dressing applied.

## 2019-10-31 NOTE — NUR
Patient bathe/linen change

Patient had a bowel movement. Patient given complete bath. Skin integrity assessed for any 
changes. Linens changed. Patient repositioned for comfort.

## 2019-10-31 NOTE — NUR
DR JOHN at bedside: Bronch procedure, patient tolerated well. See report.

-------------------------------------------------------------------------------

Addendum: 10/31/19 at 1947 by Nataliia Hernandez RN

-------------------------------------------------------------------------------

Peep okay at 8, helps patients lungs open up.

## 2019-11-01 VITALS — DIASTOLIC BLOOD PRESSURE: 62 MMHG | SYSTOLIC BLOOD PRESSURE: 119 MMHG

## 2019-11-01 VITALS — DIASTOLIC BLOOD PRESSURE: 78 MMHG | SYSTOLIC BLOOD PRESSURE: 123 MMHG

## 2019-11-01 VITALS — SYSTOLIC BLOOD PRESSURE: 91 MMHG | DIASTOLIC BLOOD PRESSURE: 54 MMHG

## 2019-11-01 VITALS — DIASTOLIC BLOOD PRESSURE: 62 MMHG | SYSTOLIC BLOOD PRESSURE: 121 MMHG

## 2019-11-01 VITALS — SYSTOLIC BLOOD PRESSURE: 98 MMHG | DIASTOLIC BLOOD PRESSURE: 63 MMHG

## 2019-11-01 VITALS — SYSTOLIC BLOOD PRESSURE: 106 MMHG | DIASTOLIC BLOOD PRESSURE: 67 MMHG

## 2019-11-01 VITALS — SYSTOLIC BLOOD PRESSURE: 102 MMHG | DIASTOLIC BLOOD PRESSURE: 62 MMHG

## 2019-11-01 VITALS — SYSTOLIC BLOOD PRESSURE: 145 MMHG | DIASTOLIC BLOOD PRESSURE: 88 MMHG

## 2019-11-01 VITALS — SYSTOLIC BLOOD PRESSURE: 92 MMHG | DIASTOLIC BLOOD PRESSURE: 40 MMHG

## 2019-11-01 VITALS — DIASTOLIC BLOOD PRESSURE: 93 MMHG | SYSTOLIC BLOOD PRESSURE: 166 MMHG

## 2019-11-01 VITALS — DIASTOLIC BLOOD PRESSURE: 67 MMHG | SYSTOLIC BLOOD PRESSURE: 118 MMHG

## 2019-11-01 VITALS — SYSTOLIC BLOOD PRESSURE: 153 MMHG | DIASTOLIC BLOOD PRESSURE: 93 MMHG

## 2019-11-01 VITALS — SYSTOLIC BLOOD PRESSURE: 148 MMHG | DIASTOLIC BLOOD PRESSURE: 79 MMHG

## 2019-11-01 VITALS — DIASTOLIC BLOOD PRESSURE: 64 MMHG | SYSTOLIC BLOOD PRESSURE: 96 MMHG

## 2019-11-01 VITALS — SYSTOLIC BLOOD PRESSURE: 135 MMHG | DIASTOLIC BLOOD PRESSURE: 107 MMHG

## 2019-11-01 VITALS — SYSTOLIC BLOOD PRESSURE: 89 MMHG | DIASTOLIC BLOOD PRESSURE: 50 MMHG

## 2019-11-01 VITALS — SYSTOLIC BLOOD PRESSURE: 112 MMHG | DIASTOLIC BLOOD PRESSURE: 68 MMHG

## 2019-11-01 VITALS — DIASTOLIC BLOOD PRESSURE: 61 MMHG | SYSTOLIC BLOOD PRESSURE: 92 MMHG

## 2019-11-01 VITALS — SYSTOLIC BLOOD PRESSURE: 117 MMHG | DIASTOLIC BLOOD PRESSURE: 80 MMHG

## 2019-11-01 VITALS — DIASTOLIC BLOOD PRESSURE: 51 MMHG | SYSTOLIC BLOOD PRESSURE: 90 MMHG

## 2019-11-01 VITALS — SYSTOLIC BLOOD PRESSURE: 120 MMHG | DIASTOLIC BLOOD PRESSURE: 64 MMHG

## 2019-11-01 VITALS — DIASTOLIC BLOOD PRESSURE: 60 MMHG | SYSTOLIC BLOOD PRESSURE: 104 MMHG

## 2019-11-01 VITALS — SYSTOLIC BLOOD PRESSURE: 109 MMHG | DIASTOLIC BLOOD PRESSURE: 58 MMHG

## 2019-11-01 VITALS — SYSTOLIC BLOOD PRESSURE: 110 MMHG | DIASTOLIC BLOOD PRESSURE: 65 MMHG

## 2019-11-01 VITALS — SYSTOLIC BLOOD PRESSURE: 98 MMHG | DIASTOLIC BLOOD PRESSURE: 54 MMHG

## 2019-11-01 VITALS — SYSTOLIC BLOOD PRESSURE: 128 MMHG | DIASTOLIC BLOOD PRESSURE: 82 MMHG

## 2019-11-01 VITALS — DIASTOLIC BLOOD PRESSURE: 66 MMHG | SYSTOLIC BLOOD PRESSURE: 110 MMHG

## 2019-11-01 VITALS — SYSTOLIC BLOOD PRESSURE: 117 MMHG | DIASTOLIC BLOOD PRESSURE: 61 MMHG

## 2019-11-01 VITALS — SYSTOLIC BLOOD PRESSURE: 115 MMHG | DIASTOLIC BLOOD PRESSURE: 65 MMHG

## 2019-11-01 VITALS — SYSTOLIC BLOOD PRESSURE: 102 MMHG | DIASTOLIC BLOOD PRESSURE: 66 MMHG

## 2019-11-01 VITALS — DIASTOLIC BLOOD PRESSURE: 59 MMHG | SYSTOLIC BLOOD PRESSURE: 95 MMHG

## 2019-11-01 VITALS — DIASTOLIC BLOOD PRESSURE: 57 MMHG | SYSTOLIC BLOOD PRESSURE: 100 MMHG

## 2019-11-01 VITALS — DIASTOLIC BLOOD PRESSURE: 59 MMHG | SYSTOLIC BLOOD PRESSURE: 100 MMHG

## 2019-11-01 VITALS — SYSTOLIC BLOOD PRESSURE: 124 MMHG | DIASTOLIC BLOOD PRESSURE: 69 MMHG

## 2019-11-01 VITALS — SYSTOLIC BLOOD PRESSURE: 142 MMHG | DIASTOLIC BLOOD PRESSURE: 92 MMHG

## 2019-11-01 VITALS — DIASTOLIC BLOOD PRESSURE: 53 MMHG | SYSTOLIC BLOOD PRESSURE: 97 MMHG

## 2019-11-01 VITALS — DIASTOLIC BLOOD PRESSURE: 89 MMHG | SYSTOLIC BLOOD PRESSURE: 154 MMHG

## 2019-11-01 VITALS — SYSTOLIC BLOOD PRESSURE: 110 MMHG | DIASTOLIC BLOOD PRESSURE: 64 MMHG

## 2019-11-01 VITALS — SYSTOLIC BLOOD PRESSURE: 107 MMHG | DIASTOLIC BLOOD PRESSURE: 66 MMHG

## 2019-11-01 VITALS — SYSTOLIC BLOOD PRESSURE: 109 MMHG | DIASTOLIC BLOOD PRESSURE: 61 MMHG

## 2019-11-01 VITALS — DIASTOLIC BLOOD PRESSURE: 55 MMHG | SYSTOLIC BLOOD PRESSURE: 93 MMHG

## 2019-11-01 VITALS — SYSTOLIC BLOOD PRESSURE: 132 MMHG | DIASTOLIC BLOOD PRESSURE: 79 MMHG

## 2019-11-01 VITALS — SYSTOLIC BLOOD PRESSURE: 115 MMHG | DIASTOLIC BLOOD PRESSURE: 71 MMHG

## 2019-11-01 VITALS — DIASTOLIC BLOOD PRESSURE: 65 MMHG | SYSTOLIC BLOOD PRESSURE: 120 MMHG

## 2019-11-01 VITALS — DIASTOLIC BLOOD PRESSURE: 58 MMHG | SYSTOLIC BLOOD PRESSURE: 102 MMHG

## 2019-11-01 VITALS — SYSTOLIC BLOOD PRESSURE: 91 MMHG | DIASTOLIC BLOOD PRESSURE: 55 MMHG

## 2019-11-01 VITALS — DIASTOLIC BLOOD PRESSURE: 71 MMHG | SYSTOLIC BLOOD PRESSURE: 131 MMHG

## 2019-11-01 VITALS — DIASTOLIC BLOOD PRESSURE: 78 MMHG | SYSTOLIC BLOOD PRESSURE: 141 MMHG

## 2019-11-01 VITALS — SYSTOLIC BLOOD PRESSURE: 98 MMHG | DIASTOLIC BLOOD PRESSURE: 53 MMHG

## 2019-11-01 VITALS — DIASTOLIC BLOOD PRESSURE: 65 MMHG | SYSTOLIC BLOOD PRESSURE: 110 MMHG

## 2019-11-01 VITALS — DIASTOLIC BLOOD PRESSURE: 61 MMHG | SYSTOLIC BLOOD PRESSURE: 99 MMHG

## 2019-11-01 VITALS — DIASTOLIC BLOOD PRESSURE: 77 MMHG | SYSTOLIC BLOOD PRESSURE: 134 MMHG

## 2019-11-01 VITALS — DIASTOLIC BLOOD PRESSURE: 53 MMHG | SYSTOLIC BLOOD PRESSURE: 91 MMHG

## 2019-11-01 VITALS — SYSTOLIC BLOOD PRESSURE: 151 MMHG | DIASTOLIC BLOOD PRESSURE: 87 MMHG

## 2019-11-01 VITALS — DIASTOLIC BLOOD PRESSURE: 48 MMHG | SYSTOLIC BLOOD PRESSURE: 83 MMHG

## 2019-11-01 VITALS — DIASTOLIC BLOOD PRESSURE: 94 MMHG | SYSTOLIC BLOOD PRESSURE: 180 MMHG

## 2019-11-01 VITALS — SYSTOLIC BLOOD PRESSURE: 111 MMHG | DIASTOLIC BLOOD PRESSURE: 87 MMHG

## 2019-11-01 VITALS — SYSTOLIC BLOOD PRESSURE: 111 MMHG | DIASTOLIC BLOOD PRESSURE: 66 MMHG

## 2019-11-01 VITALS — SYSTOLIC BLOOD PRESSURE: 106 MMHG | DIASTOLIC BLOOD PRESSURE: 64 MMHG

## 2019-11-01 VITALS — DIASTOLIC BLOOD PRESSURE: 90 MMHG | SYSTOLIC BLOOD PRESSURE: 151 MMHG

## 2019-11-01 VITALS — SYSTOLIC BLOOD PRESSURE: 95 MMHG | DIASTOLIC BLOOD PRESSURE: 52 MMHG

## 2019-11-01 VITALS — SYSTOLIC BLOOD PRESSURE: 94 MMHG | DIASTOLIC BLOOD PRESSURE: 55 MMHG

## 2019-11-01 VITALS — SYSTOLIC BLOOD PRESSURE: 99 MMHG | DIASTOLIC BLOOD PRESSURE: 62 MMHG

## 2019-11-01 VITALS — SYSTOLIC BLOOD PRESSURE: 103 MMHG | DIASTOLIC BLOOD PRESSURE: 66 MMHG

## 2019-11-01 VITALS — SYSTOLIC BLOOD PRESSURE: 151 MMHG | DIASTOLIC BLOOD PRESSURE: 86 MMHG

## 2019-11-01 VITALS — SYSTOLIC BLOOD PRESSURE: 133 MMHG | DIASTOLIC BLOOD PRESSURE: 83 MMHG

## 2019-11-01 VITALS — DIASTOLIC BLOOD PRESSURE: 73 MMHG | SYSTOLIC BLOOD PRESSURE: 124 MMHG

## 2019-11-01 VITALS — SYSTOLIC BLOOD PRESSURE: 129 MMHG | DIASTOLIC BLOOD PRESSURE: 80 MMHG

## 2019-11-01 VITALS — SYSTOLIC BLOOD PRESSURE: 126 MMHG | DIASTOLIC BLOOD PRESSURE: 73 MMHG

## 2019-11-01 VITALS — SYSTOLIC BLOOD PRESSURE: 141 MMHG | DIASTOLIC BLOOD PRESSURE: 82 MMHG

## 2019-11-01 VITALS — DIASTOLIC BLOOD PRESSURE: 87 MMHG | SYSTOLIC BLOOD PRESSURE: 157 MMHG

## 2019-11-01 VITALS — DIASTOLIC BLOOD PRESSURE: 61 MMHG | SYSTOLIC BLOOD PRESSURE: 107 MMHG

## 2019-11-01 VITALS — DIASTOLIC BLOOD PRESSURE: 61 MMHG | SYSTOLIC BLOOD PRESSURE: 95 MMHG

## 2019-11-01 VITALS — DIASTOLIC BLOOD PRESSURE: 64 MMHG | SYSTOLIC BLOOD PRESSURE: 109 MMHG

## 2019-11-01 VITALS — SYSTOLIC BLOOD PRESSURE: 95 MMHG | DIASTOLIC BLOOD PRESSURE: 51 MMHG

## 2019-11-01 VITALS — DIASTOLIC BLOOD PRESSURE: 93 MMHG | SYSTOLIC BLOOD PRESSURE: 152 MMHG

## 2019-11-01 VITALS — SYSTOLIC BLOOD PRESSURE: 98 MMHG | DIASTOLIC BLOOD PRESSURE: 59 MMHG

## 2019-11-01 VITALS — DIASTOLIC BLOOD PRESSURE: 54 MMHG | SYSTOLIC BLOOD PRESSURE: 94 MMHG

## 2019-11-01 VITALS — SYSTOLIC BLOOD PRESSURE: 124 MMHG | DIASTOLIC BLOOD PRESSURE: 73 MMHG

## 2019-11-01 VITALS — DIASTOLIC BLOOD PRESSURE: 76 MMHG | SYSTOLIC BLOOD PRESSURE: 123 MMHG

## 2019-11-01 VITALS — SYSTOLIC BLOOD PRESSURE: 123 MMHG | DIASTOLIC BLOOD PRESSURE: 73 MMHG

## 2019-11-01 VITALS — SYSTOLIC BLOOD PRESSURE: 103 MMHG | DIASTOLIC BLOOD PRESSURE: 60 MMHG

## 2019-11-01 VITALS — SYSTOLIC BLOOD PRESSURE: 98 MMHG | DIASTOLIC BLOOD PRESSURE: 57 MMHG

## 2019-11-01 VITALS — DIASTOLIC BLOOD PRESSURE: 81 MMHG | SYSTOLIC BLOOD PRESSURE: 117 MMHG

## 2019-11-01 VITALS — DIASTOLIC BLOOD PRESSURE: 65 MMHG | SYSTOLIC BLOOD PRESSURE: 105 MMHG

## 2019-11-01 VITALS — SYSTOLIC BLOOD PRESSURE: 91 MMHG | DIASTOLIC BLOOD PRESSURE: 52 MMHG

## 2019-11-01 VITALS — SYSTOLIC BLOOD PRESSURE: 105 MMHG | DIASTOLIC BLOOD PRESSURE: 56 MMHG

## 2019-11-01 VITALS — DIASTOLIC BLOOD PRESSURE: 54 MMHG | SYSTOLIC BLOOD PRESSURE: 98 MMHG

## 2019-11-01 VITALS — SYSTOLIC BLOOD PRESSURE: 113 MMHG | DIASTOLIC BLOOD PRESSURE: 65 MMHG

## 2019-11-01 VITALS — DIASTOLIC BLOOD PRESSURE: 60 MMHG | SYSTOLIC BLOOD PRESSURE: 98 MMHG

## 2019-11-01 VITALS — SYSTOLIC BLOOD PRESSURE: 158 MMHG | DIASTOLIC BLOOD PRESSURE: 92 MMHG

## 2019-11-01 LAB
ANION GAP SERPL CALCULATED.3IONS-SCNC: 8 MMOL/L (ref 5–15)
BUN SERPL-MCNC: 39 MG/DL (ref 7–18)
BUN/CREAT SERPL: 31.7
CALCIUM SERPL-MCNC: 8.5 MG/DL (ref 8.5–10.1)
CHLORIDE SERPL-SCNC: 106 MMOL/L (ref 98–107)
CO2 SERPL-SCNC: 30 MMOL/L (ref 21–32)
GLUCOSE SERPL-MCNC: 191 MG/DL (ref 74–106)
HCT VFR BLD AUTO: 33.9 % (ref 41–53)
HGB BLD-MCNC: 11.8 G/DL (ref 13.5–17.5)
LACTATE PLASV-SCNC: 2.1 MMOL/L (ref 0.4–2)
MCH RBC QN AUTO: 32.3 PG (ref 28–32)
MCV RBC AUTO: 93 FL (ref 80–100)
POTASSIUM SERPL-SCNC: 3.1 MMOL/L (ref 3.5–5.1)
SODIUM SERPL-SCNC: 144 MMOL/L (ref 136–145)

## 2019-11-01 RX ADMIN — HUMAN INSULIN SCH UNITS: 100 INJECTION, SOLUTION SUBCUTANEOUS at 11:45

## 2019-11-01 RX ADMIN — IPRATROPIUM BROMIDE SCH MG: 0.5 SOLUTION RESPIRATORY (INHALATION) at 18:27

## 2019-11-01 RX ADMIN — DEXTROSE SCH MLS/HR: 50 INJECTION, SOLUTION INTRAVENOUS at 04:16

## 2019-11-01 RX ADMIN — Medication SCH STRIP: at 18:01

## 2019-11-01 RX ADMIN — LINEZOLID SCH MLS/HR: 600 INJECTION, SOLUTION INTRAVENOUS at 10:37

## 2019-11-01 RX ADMIN — HUMAN INSULIN SCH UNITS: 100 INJECTION, SOLUTION SUBCUTANEOUS at 18:00

## 2019-11-01 RX ADMIN — ALBUTEROL SULFATE SCH MG: 2.5 SOLUTION RESPIRATORY (INHALATION) at 18:27

## 2019-11-01 RX ADMIN — PROPOFOL SCH MLS/HR: 10 INJECTION, EMULSION INTRAVENOUS at 11:45

## 2019-11-01 RX ADMIN — Medication SCH STRIP: at 11:45

## 2019-11-01 RX ADMIN — PIPERACILLIN SODIUM AND TAZOBACTAM SODIUM SCH MLS/HR: .25; 2 INJECTION, POWDER, LYOPHILIZED, FOR SOLUTION INTRAVENOUS at 18:00

## 2019-11-01 RX ADMIN — PIPERACILLIN SODIUM AND TAZOBACTAM SODIUM SCH MLS/HR: .25; 2 INJECTION, POWDER, LYOPHILIZED, FOR SOLUTION INTRAVENOUS at 00:15

## 2019-11-01 RX ADMIN — MIDAZOLAM SCH MLS/HR: 5 INJECTION, SOLUTION INTRAMUSCULAR; INTRAVENOUS at 10:40

## 2019-11-01 RX ADMIN — DEXTROSE SCH MLS/HR: 50 INJECTION, SOLUTION INTRAVENOUS at 15:09

## 2019-11-01 RX ADMIN — HUMAN INSULIN SCH UNITS: 100 INJECTION, SOLUTION SUBCUTANEOUS at 06:45

## 2019-11-01 RX ADMIN — HUMAN INSULIN SCH UNITS: 100 INJECTION, SOLUTION SUBCUTANEOUS at 23:39

## 2019-11-01 RX ADMIN — ALBUTEROL SULFATE SCH MG: 2.5 SOLUTION RESPIRATORY (INHALATION) at 06:13

## 2019-11-01 RX ADMIN — ALBUTEROL SULFATE SCH MG: 2.5 SOLUTION RESPIRATORY (INHALATION) at 12:26

## 2019-11-01 RX ADMIN — SODIUM CHLORIDE SCH MG: 9 INJECTION, SOLUTION INTRAVENOUS at 10:37

## 2019-11-01 RX ADMIN — MIDAZOLAM SCH MLS/HR: 5 INJECTION, SOLUTION INTRAMUSCULAR; INTRAVENOUS at 00:19

## 2019-11-01 RX ADMIN — PIPERACILLIN SODIUM AND TAZOBACTAM SODIUM SCH MLS/HR: .25; 2 INJECTION, POWDER, LYOPHILIZED, FOR SOLUTION INTRAVENOUS at 23:44

## 2019-11-01 RX ADMIN — IPRATROPIUM BROMIDE SCH MG: 0.5 SOLUTION RESPIRATORY (INHALATION) at 00:05

## 2019-11-01 RX ADMIN — IPRATROPIUM BROMIDE SCH MG: 0.5 SOLUTION RESPIRATORY (INHALATION) at 06:13

## 2019-11-01 RX ADMIN — ALBUTEROL SULFATE SCH MG: 2.5 SOLUTION RESPIRATORY (INHALATION) at 00:06

## 2019-11-01 RX ADMIN — IPRATROPIUM BROMIDE SCH MG: 0.5 SOLUTION RESPIRATORY (INHALATION) at 12:26

## 2019-11-01 RX ADMIN — PIPERACILLIN SODIUM AND TAZOBACTAM SODIUM SCH MLS/HR: .25; 2 INJECTION, POWDER, LYOPHILIZED, FOR SOLUTION INTRAVENOUS at 12:45

## 2019-11-01 RX ADMIN — LINEZOLID SCH MLS/HR: 600 INJECTION, SOLUTION INTRAVENOUS at 21:40

## 2019-11-01 RX ADMIN — ENOXAPARIN SODIUM SCH MG: 30 INJECTION SUBCUTANEOUS at 10:38

## 2019-11-01 RX ADMIN — PIPERACILLIN SODIUM AND TAZOBACTAM SODIUM SCH MLS/HR: .25; 2 INJECTION, POWDER, LYOPHILIZED, FOR SOLUTION INTRAVENOUS at 05:53

## 2019-11-01 RX ADMIN — Medication SCH STRIP: at 23:38

## 2019-11-01 RX ADMIN — FENTANYL CITRATE SCH MLS/HR: 50 INJECTION, SOLUTION INTRAMUSCULAR; INTRAVENOUS at 00:16

## 2019-11-01 RX ADMIN — DEXTROSE SCH MLS/HR: 50 INJECTION, SOLUTION INTRAVENOUS at 10:15

## 2019-11-01 RX ADMIN — HUMAN INSULIN SCH UNITS: 100 INJECTION, SOLUTION SUBCUTANEOUS at 00:42

## 2019-11-01 RX ADMIN — PANTOPRAZOLE SODIUM SCH MG: 40 INJECTION, POWDER, FOR SOLUTION INTRAVENOUS at 10:37

## 2019-11-01 RX ADMIN — Medication SCH STRIP: at 06:30

## 2019-11-01 RX ADMIN — Medication SCH STRIP: at 00:25

## 2019-11-01 NOTE — NUR
Patient extubated at 1635- still lethargic arousing patient on cool aerosol mask at 45%, 
patient sats in low to mid 90's -continue to monitor at bedside.

## 2019-11-01 NOTE — NUR
OPENING

ASSUMED CARE OF PATIENT ALERT AND ORIENTED X2. PUPILS EQUAL AND REACTIVE. MOEVS ALL 
EXTREMIEITES. COMBATIVE, AGITATED, CONFUSED. ATTEMPTING TO KICK NURSE DURING ASSESSMENT, 
SCREAMING "FUCK YOU". SOFT RESTRAINTS IN PLACE TO BILATERAL WRISTS. SAFETY PRECAUTIONS IN 
PLACE. SKIN INTEGRITY CHECKED PER PROTOCOL. SKIN INTACT. RESPIRATIONS EVEN AND LABORED, 
TRYING TO PULL AT MASK AND KICK NUURSE.

## 2019-11-01 NOTE — NUR
Respiratory note:

DR JOHN CALLED AND GIVEN WEANING PARAMETERS, AS WELL AS ABG RESULTS ON CPAP.  ORDERS WERE 
GIVEN TO  EXTUBATE PT., BIPAP PRN 16/6 BUR 12 TITRATE FIO2 TO MAINTAIN SPO2 >92%, COOL 
AEROSOL VIA MASK, AND RACEMIC EPINEPHRINE X2 IF NEEDED.  RN MADE AWARE.

## 2019-11-01 NOTE — NUR
PT AGITATION

PT CONTINUES TO BE COMBATIVE KICKING AT NURSE. SCREAMING "FUCK YOU, MOTHER FUCKER" UPON 
CARE. ATTEMPTING TO GET OUT OF BED AND KICK AT STAFF. ATIVAN .5 MG AT 2140 WITH NO OBSERVED 
EFFECT. VERBALLY TALKED PT DOWN. PT CALM FOR TIME BEING. VSS. REORIENTED TO TIME AND 
PLACE.NURSE SITTING IN PATIENTS ROOM FOR SAFETY MEASURES. BED IN LOWEST LOCKED POSITION. NO 
PAIN NOTED. WILL CONTINUE TO MONITOR.

## 2019-11-01 NOTE — NUR
After getting extubated patient got really combated - hitting, punching, and cussing at the 
nurses. Daughter Gaby notified, MD notified and restraints obtained. Continue to monitor 
for safety.

## 2019-11-01 NOTE — NUR
UNSUCCESSFUL AWAKENING TRIAL FOR CPAP-



PATIENT EXTREMELY AGITATED, DAUGHTER AND SISTER AT BEDSIDE. WE WERE UNABLE TO CALM PATIENT 
DOWN KEPT DISCONNECTING ETT (WITH MITTENS ON) AND PRECEDEX, AND TRYING TO GET OUT OF BED. 
DR. JOHN AT BEDSIDE, HE SAID TO TRY TO CPAP TODAY WITH LOW DOSE SEDATION 
(DIPRIVAN/FENTANYL).

## 2019-11-01 NOTE — NUR
OPENING CONTINUED

MASK ON 02 SAT 95%. ST ON CARDIAC MONITOR. RIGHT IJ 3 LUMEN SALINE LOCKED AT THIS TIME. 
LAGUNA CATHETER  PATENT AND DRAINING YELLOW URINE WITH SEDIMENT TO GRAVITY. BED IN LOWEST 
LOCKED POSITION. NO PAIN INDICATED. SAFETY MEASURES IN PLACE. WILL CONTINUE TO MONITOR. 
SMEAR BROWN BM AT THIS TIME.

## 2019-11-01 NOTE — NUR
FAMILY

CALL FROM DAUGHTER CHASE, UPDATED ON PATIENTS STATUS. ALL QUESTIONS AND CONCERNS ADDRESSED 
AT THIS TIME. DAUGHTER VERBALIZED UNDERSTANDING.

## 2019-11-02 VITALS — SYSTOLIC BLOOD PRESSURE: 146 MMHG | DIASTOLIC BLOOD PRESSURE: 84 MMHG

## 2019-11-02 VITALS — SYSTOLIC BLOOD PRESSURE: 155 MMHG | DIASTOLIC BLOOD PRESSURE: 85 MMHG

## 2019-11-02 VITALS — SYSTOLIC BLOOD PRESSURE: 154 MMHG | DIASTOLIC BLOOD PRESSURE: 84 MMHG

## 2019-11-02 VITALS — SYSTOLIC BLOOD PRESSURE: 154 MMHG | DIASTOLIC BLOOD PRESSURE: 93 MMHG

## 2019-11-02 VITALS — DIASTOLIC BLOOD PRESSURE: 83 MMHG | SYSTOLIC BLOOD PRESSURE: 152 MMHG

## 2019-11-02 VITALS — SYSTOLIC BLOOD PRESSURE: 166 MMHG | DIASTOLIC BLOOD PRESSURE: 93 MMHG

## 2019-11-02 VITALS — DIASTOLIC BLOOD PRESSURE: 85 MMHG | SYSTOLIC BLOOD PRESSURE: 145 MMHG

## 2019-11-02 VITALS — DIASTOLIC BLOOD PRESSURE: 86 MMHG | SYSTOLIC BLOOD PRESSURE: 151 MMHG

## 2019-11-02 VITALS — DIASTOLIC BLOOD PRESSURE: 92 MMHG | SYSTOLIC BLOOD PRESSURE: 150 MMHG

## 2019-11-02 VITALS — SYSTOLIC BLOOD PRESSURE: 142 MMHG | DIASTOLIC BLOOD PRESSURE: 87 MMHG

## 2019-11-02 VITALS — DIASTOLIC BLOOD PRESSURE: 83 MMHG | SYSTOLIC BLOOD PRESSURE: 118 MMHG

## 2019-11-02 VITALS — SYSTOLIC BLOOD PRESSURE: 158 MMHG | DIASTOLIC BLOOD PRESSURE: 84 MMHG

## 2019-11-02 VITALS — SYSTOLIC BLOOD PRESSURE: 138 MMHG | DIASTOLIC BLOOD PRESSURE: 79 MMHG

## 2019-11-02 VITALS — DIASTOLIC BLOOD PRESSURE: 68 MMHG | SYSTOLIC BLOOD PRESSURE: 129 MMHG

## 2019-11-02 VITALS — SYSTOLIC BLOOD PRESSURE: 147 MMHG | DIASTOLIC BLOOD PRESSURE: 85 MMHG

## 2019-11-02 VITALS — DIASTOLIC BLOOD PRESSURE: 101 MMHG | SYSTOLIC BLOOD PRESSURE: 168 MMHG

## 2019-11-02 VITALS — SYSTOLIC BLOOD PRESSURE: 157 MMHG | DIASTOLIC BLOOD PRESSURE: 88 MMHG

## 2019-11-02 VITALS — DIASTOLIC BLOOD PRESSURE: 85 MMHG | SYSTOLIC BLOOD PRESSURE: 136 MMHG

## 2019-11-02 VITALS — DIASTOLIC BLOOD PRESSURE: 86 MMHG | SYSTOLIC BLOOD PRESSURE: 145 MMHG

## 2019-11-02 VITALS — SYSTOLIC BLOOD PRESSURE: 121 MMHG | DIASTOLIC BLOOD PRESSURE: 76 MMHG

## 2019-11-02 VITALS — DIASTOLIC BLOOD PRESSURE: 91 MMHG | SYSTOLIC BLOOD PRESSURE: 146 MMHG

## 2019-11-02 VITALS — DIASTOLIC BLOOD PRESSURE: 88 MMHG | SYSTOLIC BLOOD PRESSURE: 147 MMHG

## 2019-11-02 VITALS — DIASTOLIC BLOOD PRESSURE: 84 MMHG | SYSTOLIC BLOOD PRESSURE: 126 MMHG

## 2019-11-02 VITALS — SYSTOLIC BLOOD PRESSURE: 148 MMHG | DIASTOLIC BLOOD PRESSURE: 111 MMHG

## 2019-11-02 VITALS — DIASTOLIC BLOOD PRESSURE: 89 MMHG | SYSTOLIC BLOOD PRESSURE: 143 MMHG

## 2019-11-02 VITALS — SYSTOLIC BLOOD PRESSURE: 140 MMHG | DIASTOLIC BLOOD PRESSURE: 88 MMHG

## 2019-11-02 VITALS — DIASTOLIC BLOOD PRESSURE: 81 MMHG | SYSTOLIC BLOOD PRESSURE: 146 MMHG

## 2019-11-02 VITALS — DIASTOLIC BLOOD PRESSURE: 79 MMHG | SYSTOLIC BLOOD PRESSURE: 156 MMHG

## 2019-11-02 LAB
HCT VFR BLD AUTO: 35.2 % (ref 41–53)
HGB BLD-MCNC: 12.1 G/DL (ref 13.5–17.5)
MCH RBC QN AUTO: 31.8 PG (ref 28–32)
MCV RBC AUTO: 92.2 FL (ref 80–100)

## 2019-11-02 RX ADMIN — SODIUM CHLORIDE SCH MG: 9 INJECTION, SOLUTION INTRAVENOUS at 10:00

## 2019-11-02 RX ADMIN — IPRATROPIUM BROMIDE SCH MG: 0.5 SOLUTION RESPIRATORY (INHALATION) at 18:11

## 2019-11-02 RX ADMIN — ACETAMINOPHEN PRN MG: 500 TABLET ORAL at 23:04

## 2019-11-02 RX ADMIN — Medication SCH STRIP: at 11:56

## 2019-11-02 RX ADMIN — Medication SCH STRIP: at 18:15

## 2019-11-02 RX ADMIN — Medication SCH STRIP: at 05:49

## 2019-11-02 RX ADMIN — ALBUTEROL SULFATE SCH MG: 2.5 SOLUTION RESPIRATORY (INHALATION) at 11:30

## 2019-11-02 RX ADMIN — HUMAN INSULIN SCH UNITS: 100 INJECTION, SOLUTION SUBCUTANEOUS at 18:15

## 2019-11-02 RX ADMIN — ALBUTEROL SULFATE SCH MG: 2.5 SOLUTION RESPIRATORY (INHALATION) at 00:34

## 2019-11-02 RX ADMIN — HUMAN INSULIN SCH UNITS: 100 INJECTION, SOLUTION SUBCUTANEOUS at 11:56

## 2019-11-02 RX ADMIN — ALBUTEROL SULFATE SCH MG: 2.5 SOLUTION RESPIRATORY (INHALATION) at 05:52

## 2019-11-02 RX ADMIN — DEXTROSE SCH MLS/HR: 50 INJECTION, SOLUTION INTRAVENOUS at 11:49

## 2019-11-02 RX ADMIN — CEFTRIAXONE SODIUM SCH MLS/HR: 1 INJECTION, POWDER, FOR SOLUTION INTRAMUSCULAR; INTRAVENOUS at 13:39

## 2019-11-02 RX ADMIN — PIPERACILLIN SODIUM AND TAZOBACTAM SODIUM SCH MLS/HR: .25; 2 INJECTION, POWDER, LYOPHILIZED, FOR SOLUTION INTRAVENOUS at 05:48

## 2019-11-02 RX ADMIN — PROPOFOL SCH MLS/HR: 10 INJECTION, EMULSION INTRAVENOUS at 11:05

## 2019-11-02 RX ADMIN — HUMAN INSULIN SCH UNITS: 100 INJECTION, SOLUTION SUBCUTANEOUS at 05:49

## 2019-11-02 RX ADMIN — ALBUTEROL SULFATE SCH MG: 2.5 SOLUTION RESPIRATORY (INHALATION) at 18:11

## 2019-11-02 RX ADMIN — IPRATROPIUM BROMIDE SCH MG: 0.5 SOLUTION RESPIRATORY (INHALATION) at 11:30

## 2019-11-02 RX ADMIN — LINEZOLID SCH MLS/HR: 600 INJECTION, SOLUTION INTRAVENOUS at 10:07

## 2019-11-02 RX ADMIN — PANTOPRAZOLE SODIUM SCH MG: 40 INJECTION, POWDER, FOR SOLUTION INTRAVENOUS at 10:07

## 2019-11-02 RX ADMIN — ENOXAPARIN SODIUM SCH MG: 30 INJECTION SUBCUTANEOUS at 10:07

## 2019-11-02 RX ADMIN — IPRATROPIUM BROMIDE SCH MG: 0.5 SOLUTION RESPIRATORY (INHALATION) at 05:52

## 2019-11-02 RX ADMIN — IPRATROPIUM BROMIDE SCH MG: 0.5 SOLUTION RESPIRATORY (INHALATION) at 00:33

## 2019-11-02 NOTE — NUR
Nutrition Follow-up Notes



Wt.: 94.7 kg



Pt's successfully extubated sleeping with no family by beside. per nursing records pt 
showing compliance. pt is currently NPO was on EN support with Glucerna 1.2 [place] @ 30 ml/hr 
providing 864 kcal, 43 gms pro and 580 ml free water. 



Est. Needs BW 94 [id number] kcal (23-25 kcal/kgBW), 75-94 gms pro (0.8-1.0 gms/kgBW r/t 
elev RFT). Will continue to monitor pertinent labs and reassess nutrient need prn



Labs:  H, BUN 39 H



Skin: Rambo scale 11, high risk, skin intact per RN doc

 

GI: Pt had 2 BM today per RN documentation. 



PES: 

Impaired swallowing r.t current medical condition aeb pt`s intubated sedated with order of 
NPO

Altered nutrition related lab values r/t current/chronic medical condition aeb 
hyperglycemia, elev RFT mild hypoalb

 

Will continue to monitor NPO status, skin status, pertinent labs and weight trend. F/u in 2 
to 3 days.

Rec.: 1.) refer to CDE on DC. 2) advance diet as medically feasible. 3) continue current 
plan of care

## 2019-11-02 NOTE — NUR
OPENING

ASSUMED CARE OF PATIENT ALERT AND ORIENTED X3. PUPILS EQUAL AND REACTIVE. MOVES ALL 
EXTREMITIES. PERIODS OF CONFUSION. RESPIRATIONS EVEN AND UNLABORED. SR ON CARDIAC MONITOR. 
LAGUNA CATHETER PATENT AND DRAINING YELLOW URINE TO GRAVITY. LOOSE BM AT THIS TIME. FULL BED 
BATH AND LINEN CHANGE DONE, TOLERATED WELL. VSS. SAFETY PRECAUTIONS IN PLACE. SKIN INTACT. 
BED IN LOWEST LOCKED POSITION. PT ORIENTED TO CALL LIGHT, EDUCATED TO CALL FOR ASSISTANCE 
BEFORE GETTING OUT OF BED, VERBALIZED UNDERSTANDING. IN FULL VIEW OF NURSES STATION. WILL 
CONTINUE TO MONITOR.

## 2019-11-02 NOTE — NUR
DAUGHTER AT BEDSIDE

SHE IS ABLE TO CALM PT DOWN AND PT IS BEING COOPERATIVE AT THIS TIME. RESTRAINTS REMOVED. 
WILL CONTINUE TO MONITOR

## 2019-11-02 NOTE — NUR
FAMILY AT BEDSIDEFAMILY IS ASSISTING IN FEEDING PT'S DINNER. PT IS CALM AND COOPERATIVE. 
WILL CONTINUE TO MONITOR

## 2019-11-02 NOTE — NUR
TRANSFERRED PATIENT

TO ROOM 219A VIA WHEEL CHAIR ON TELE MONITOR AND OXYMIZER WITH O2 TANK. VSS. CARE ENDORSED 
TO JORDON HERZOG.

## 2019-11-02 NOTE — NUR
BM/ LINEN CHANGE

AT THIS TIME. PT CLEANED AND LINENS CHANGED. COMBATIVE, CONFUSED, AGITATED. VSS. SAFETY 
MEASURES IN PLACE. WILL CONTINUE TO MONITOR.

## 2019-11-02 NOTE — NUR
LINEN CHANGE AND BED BATH DONE AT THIS TIME

BM AT THIS TIME LINENS CHANGED AND PATIENT BATHED AND CLEANED UP. SKIN ASSESSED FOR ANY 
CHANGES. VSS. PT COMBATIVE, CONFUSED, AGITATED. VSS. WILL CONTINUE TO MONITOR.

## 2019-11-03 VITALS — DIASTOLIC BLOOD PRESSURE: 66 MMHG | SYSTOLIC BLOOD PRESSURE: 106 MMHG

## 2019-11-03 VITALS — DIASTOLIC BLOOD PRESSURE: 83 MMHG | SYSTOLIC BLOOD PRESSURE: 128 MMHG

## 2019-11-03 VITALS — DIASTOLIC BLOOD PRESSURE: 85 MMHG | SYSTOLIC BLOOD PRESSURE: 124 MMHG

## 2019-11-03 VITALS — SYSTOLIC BLOOD PRESSURE: 134 MMHG | DIASTOLIC BLOOD PRESSURE: 68 MMHG

## 2019-11-03 VITALS — DIASTOLIC BLOOD PRESSURE: 71 MMHG | SYSTOLIC BLOOD PRESSURE: 123 MMHG

## 2019-11-03 LAB
ANION GAP SERPL CALCULATED.3IONS-SCNC: 9 MMOL/L (ref 5–15)
BUN SERPL-MCNC: 23 MG/DL (ref 7–18)
BUN/CREAT SERPL: 29.9
CALCIUM SERPL-MCNC: 8.6 MG/DL (ref 8.5–10.1)
CHLORIDE SERPL-SCNC: 100 MMOL/L (ref 98–107)
CO2 SERPL-SCNC: 30 MMOL/L (ref 21–32)
GLUCOSE SERPL-MCNC: 160 MG/DL (ref 74–106)
HCT VFR BLD AUTO: 38.5 % (ref 41–53)
HGB BLD-MCNC: 13.2 G/DL (ref 13.5–17.5)
MCH RBC QN AUTO: 32 PG (ref 28–32)
MCV RBC AUTO: 92.9 FL (ref 80–100)
NRBC BLD QL AUTO: 0 %
POTASSIUM SERPL-SCNC: 3.1 MMOL/L (ref 3.5–5.1)
SODIUM SERPL-SCNC: 139 MMOL/L (ref 136–145)

## 2019-11-03 RX ADMIN — IPRATROPIUM BROMIDE SCH MG: 0.5 SOLUTION RESPIRATORY (INHALATION) at 11:37

## 2019-11-03 RX ADMIN — ENOXAPARIN SODIUM SCH MG: 30 INJECTION SUBCUTANEOUS at 10:06

## 2019-11-03 RX ADMIN — ALBUTEROL SULFATE SCH MG: 2.5 SOLUTION RESPIRATORY (INHALATION) at 05:56

## 2019-11-03 RX ADMIN — HUMAN INSULIN SCH UNITS: 100 INJECTION, SOLUTION SUBCUTANEOUS at 00:00

## 2019-11-03 RX ADMIN — CEFTRIAXONE SODIUM SCH MLS/HR: 1 INJECTION, POWDER, FOR SOLUTION INTRAMUSCULAR; INTRAVENOUS at 10:05

## 2019-11-03 RX ADMIN — Medication SCH STRIP: at 23:30

## 2019-11-03 RX ADMIN — IPRATROPIUM BROMIDE SCH MG: 0.5 SOLUTION RESPIRATORY (INHALATION) at 05:56

## 2019-11-03 RX ADMIN — IPRATROPIUM BROMIDE SCH MG: 0.5 SOLUTION RESPIRATORY (INHALATION) at 00:45

## 2019-11-03 RX ADMIN — ALBUTEROL SULFATE SCH MG: 2.5 SOLUTION RESPIRATORY (INHALATION) at 19:06

## 2019-11-03 RX ADMIN — HUMAN INSULIN SCH UNITS: 100 INJECTION, SOLUTION SUBCUTANEOUS at 23:30

## 2019-11-03 RX ADMIN — Medication SCH STRIP: at 17:18

## 2019-11-03 RX ADMIN — ACETAMINOPHEN PRN MG: 500 TABLET ORAL at 15:23

## 2019-11-03 RX ADMIN — SODIUM CHLORIDE SCH MG: 9 INJECTION, SOLUTION INTRAVENOUS at 10:06

## 2019-11-03 RX ADMIN — Medication SCH STRIP: at 00:00

## 2019-11-03 RX ADMIN — ALBUTEROL SULFATE SCH MG: 2.5 SOLUTION RESPIRATORY (INHALATION) at 00:45

## 2019-11-03 RX ADMIN — HUMAN INSULIN SCH UNITS: 100 INJECTION, SOLUTION SUBCUTANEOUS at 17:36

## 2019-11-03 RX ADMIN — HYDROCODONE BITARTRATE AND ACETAMINOPHEN PRN TAB: 5; 325 TABLET ORAL at 23:07

## 2019-11-03 RX ADMIN — HUMAN INSULIN SCH UNITS: 100 INJECTION, SOLUTION SUBCUTANEOUS at 06:09

## 2019-11-03 RX ADMIN — HUMAN INSULIN SCH UNITS: 100 INJECTION, SOLUTION SUBCUTANEOUS at 12:17

## 2019-11-03 RX ADMIN — Medication SCH STRIP: at 06:09

## 2019-11-03 RX ADMIN — IPRATROPIUM BROMIDE SCH MG: 0.5 SOLUTION RESPIRATORY (INHALATION) at 19:06

## 2019-11-03 RX ADMIN — Medication SCH STRIP: at 12:10

## 2019-11-03 RX ADMIN — ALBUTEROL SULFATE SCH MG: 2.5 SOLUTION RESPIRATORY (INHALATION) at 11:37

## 2019-11-03 NOTE — NUR
Closing Shift Note

Patient resting in bed. No distress noted. Daughter at bedside. Report given. Will endorse 
care to the night shift RN.

## 2019-11-03 NOTE — NUR
Opening Shift Note

Assumed care of patient, awake and alert, oriented x3.  No S/S of distress/SOB or pain, 
cooperative to care at this time. Daughter and sitter at bedside.  Instructed on POC and to 
call for assist PRN, patient verbalized understanding, call light within reach, sitter 
maintained, will continue to monitor for changes Q1hr and PRN.

## 2019-11-03 NOTE — NUR
RT NOTE



PT ASKED NOT TO BE WOKEN FOR THIS TX IF SLEEPING. PT SLEEPING. NO SIGNS OF RESP DISTRESS 
NOTED BY RT. SITTER AT BEDSIDE.

## 2019-11-03 NOTE — NUR
Call from PBX

Call from PBX at this time. Dr. Reed is not able to be paged at this time. Page went into 
Jonathan Johns NP instead. Awaiting callback.

## 2019-11-03 NOTE — NUR
Daughter at bedside

Daughter at bedside at this time. Patient is requesting to have hair washed. Daughter to 
assist patient.

## 2019-11-03 NOTE — NUR
Opening Shift Note

Assuming care of patient at this time. Patient is resting in bed with eyes closed. No 
distress noted. Patient shows no signs or symptoms of distress or shortness of breath. Bed 
is locked and lowered with side rails up x2. Will instruct patient on the plan of care for 
today once patient is awake and alert. Sitter at bedside for safety. Call light within 
reach. Will continue to round hourly and as needed.

## 2019-11-03 NOTE — NUR
Patient complained of knee pain from arthritis. Per patient, Tylenol is not doing anything. 
Will page hospitalist

## 2019-11-04 VITALS — SYSTOLIC BLOOD PRESSURE: 123 MMHG | DIASTOLIC BLOOD PRESSURE: 71 MMHG

## 2019-11-04 VITALS — SYSTOLIC BLOOD PRESSURE: 122 MMHG | DIASTOLIC BLOOD PRESSURE: 62 MMHG

## 2019-11-04 VITALS — DIASTOLIC BLOOD PRESSURE: 62 MMHG | SYSTOLIC BLOOD PRESSURE: 137 MMHG

## 2019-11-04 VITALS — DIASTOLIC BLOOD PRESSURE: 59 MMHG | SYSTOLIC BLOOD PRESSURE: 122 MMHG

## 2019-11-04 VITALS — SYSTOLIC BLOOD PRESSURE: 117 MMHG | DIASTOLIC BLOOD PRESSURE: 67 MMHG

## 2019-11-04 RX ADMIN — CEFTRIAXONE SODIUM SCH MLS/HR: 1 INJECTION, POWDER, FOR SOLUTION INTRAMUSCULAR; INTRAVENOUS at 10:21

## 2019-11-04 RX ADMIN — HUMAN INSULIN SCH UNITS: 100 INJECTION, SOLUTION SUBCUTANEOUS at 12:00

## 2019-11-04 RX ADMIN — HUMAN INSULIN SCH UNITS: 100 INJECTION, SOLUTION SUBCUTANEOUS at 23:55

## 2019-11-04 RX ADMIN — ALBUTEROL SULFATE SCH MG: 2.5 SOLUTION RESPIRATORY (INHALATION) at 00:25

## 2019-11-04 RX ADMIN — IPRATROPIUM BROMIDE SCH MG: 0.5 SOLUTION RESPIRATORY (INHALATION) at 00:25

## 2019-11-04 RX ADMIN — IPRATROPIUM BROMIDE SCH MG: 0.5 SOLUTION RESPIRATORY (INHALATION) at 11:47

## 2019-11-04 RX ADMIN — Medication SCH STRIP: at 12:52

## 2019-11-04 RX ADMIN — HUMAN INSULIN SCH UNITS: 100 INJECTION, SOLUTION SUBCUTANEOUS at 06:40

## 2019-11-04 RX ADMIN — SODIUM CHLORIDE SCH MG: 9 INJECTION, SOLUTION INTRAVENOUS at 10:21

## 2019-11-04 RX ADMIN — ALBUTEROL SULFATE SCH MG: 2.5 SOLUTION RESPIRATORY (INHALATION) at 19:02

## 2019-11-04 RX ADMIN — Medication SCH STRIP: at 06:39

## 2019-11-04 RX ADMIN — IPRATROPIUM BROMIDE SCH MG: 0.5 SOLUTION RESPIRATORY (INHALATION) at 19:02

## 2019-11-04 RX ADMIN — Medication SCH STRIP: at 18:00

## 2019-11-04 RX ADMIN — ALBUTEROL SULFATE SCH MG: 2.5 SOLUTION RESPIRATORY (INHALATION) at 05:23

## 2019-11-04 RX ADMIN — ALBUTEROL SULFATE SCH MG: 2.5 SOLUTION RESPIRATORY (INHALATION) at 23:56

## 2019-11-04 RX ADMIN — IPRATROPIUM BROMIDE SCH MG: 0.5 SOLUTION RESPIRATORY (INHALATION) at 23:56

## 2019-11-04 RX ADMIN — IPRATROPIUM BROMIDE SCH MG: 0.5 SOLUTION RESPIRATORY (INHALATION) at 05:23

## 2019-11-04 RX ADMIN — ALBUTEROL SULFATE SCH MG: 2.5 SOLUTION RESPIRATORY (INHALATION) at 11:47

## 2019-11-04 RX ADMIN — Medication SCH STRIP: at 23:55

## 2019-11-04 RX ADMIN — HUMAN INSULIN SCH UNITS: 100 INJECTION, SOLUTION SUBCUTANEOUS at 18:00

## 2019-11-04 NOTE — NUR
Opening Shift Note

Assumed care of patient, awake and alert.  No S/S of distress/SOB or pain. Wife at bedside. 
Instructed on POC and to call for assist PRN, patient verbalized understanding, call light 
within reach, will continue to monitor for changes Q1hr and PRN.

## 2019-11-04 NOTE — NUR
Patient ambulated to the restroom, when he was done he lost control of himself and almost 
fell but daughter was able to catch him. Assisted patient to his bed, sitter at bedside, 
will continue to monitor

## 2019-11-04 NOTE — NUR
pulmonology

Dr Arceo at bedside, discussing poc with patient and family, [facility] care

## 2019-11-04 NOTE — NUR
INCENTIVE SPIROMETRY 

PATIENT ABLE TO RETURN DEMONSTRATION ON HOW TO USE IS, ABLE TO RISE DEVICE TO 1500, DONE X3, 
PATIENT INSTRUCTED TO DO IT Q1HR WHILE AWAKE, PT VERBALIZED UNDERSTANDING, CONT CARE

## 2019-11-04 NOTE — NUR
Discharge planning per SS consult, patient has orders at daughter's request for a cane, BP 
and glucose monitors. Referral sent to [facility]. Received a follow up call from Reta at 
[facility] advising that the cane will be delivered to bedside some time today, however 
Medicare does not cover the other items. Placed a call to daughter Allie and advised that 
insurance does not cover and that she can purchase them over the counter from the local 
[employer] and/or any drug store. She verbalized understanding and advised she would. Notified 
nurse Nuñez of update.

## 2019-11-04 NOTE — NUR
AMBULATION

PHYSICAL THERA[Y ASSISTED PATIENT WITH AMBULATION USING FWW, PT TOLERATED ACTIVITY, NO C/O 
CP, SOB OR ANY OTHER DISCOMFORT, CONT CARE

## 2019-11-04 NOTE — NUR
OXYMIZER 4L CHANGED TO NASAL CANULA @4L

PER DR JOHN, PT O2 95%, NO C/O SOB, PT TOLERATING, CONT CARE

## 2019-11-04 NOTE — NUR
Nutrition Follow-up Notes



Wt.: 94.7 kg



Pt's sleeping with no family by bedside. per records pt with RA. pt with no distress noted 
currently on CCHO 60 gm soft diet with adequate PO of 75% x 3 per RN doc



Est. Needs BW 94 [id number] kcal (23-25 kcal/kgBW), 75-94 gms pro (0.8-1.0 gms/kgBW r/t 
elev RFT). Will continue to monitor pertinent labs and reassess nutrient need prn



Labs: BUN 23 H,  H,



Skin: Rambo scale 15, mod risk, skin intact per RN doc

 

GI: Pt had 1 BM today per RN documentation. 



PES: 

Impaired swallowing r.t current medical condition aeb pt`s intubated sedated with order of 
NPO

Altered nutrition related lab values r/t current/chronic medical condition aeb 
hyperglycemia, elev RFT mild hypoalb

 

Will continue to monitor PO intake, skin status, pertinent labs and weight trend. F/u in 3-5 
days.

Rec.: 1.) refer to CDE on DC. 2)  continue current plan of care

## 2019-11-04 NOTE — NUR
Assessment and SS consult

Pt is a 64 yr old alert and oriented male.  consult given for d/c planning/ SNF. Prior to 
admit, pt lived alone but may be moving in with his daughter after d/c. Pt ambulates with a 
cane, and functioned independently with ADL's, cooking and cleaning. Pt admitted to the 
hospital with SOB and was diagnosed with pneumonia. Pt is in the process of getting a 
walker. [facility] Pt receives  income. Pt's 
daughter, Gaby, is his POA and can be contacted at [phone]. Pt daughter will 
transport home upon d/c. Pt plans to d/c with  for safety eval, blood pressure and glucose 
monitor. 

-------------------------------------------------------------------------------

Addendum: 11/04/19 at 1605 by TAMANNA VILLALPANDO 

-------------------------------------------------------------------------------

Amended: Links added.

## 2019-11-05 VITALS — SYSTOLIC BLOOD PRESSURE: 117 MMHG | DIASTOLIC BLOOD PRESSURE: 72 MMHG

## 2019-11-05 VITALS — SYSTOLIC BLOOD PRESSURE: 108 MMHG | DIASTOLIC BLOOD PRESSURE: 65 MMHG

## 2019-11-05 VITALS — DIASTOLIC BLOOD PRESSURE: 75 MMHG | SYSTOLIC BLOOD PRESSURE: 131 MMHG

## 2019-11-05 VITALS — SYSTOLIC BLOOD PRESSURE: 137 MMHG | DIASTOLIC BLOOD PRESSURE: 70 MMHG

## 2019-11-05 VITALS — SYSTOLIC BLOOD PRESSURE: 120 MMHG | DIASTOLIC BLOOD PRESSURE: 67 MMHG

## 2019-11-05 LAB
ANION GAP SERPL CALCULATED.3IONS-SCNC: 7 MMOL/L (ref 5–15)
BUN SERPL-MCNC: 20 MG/DL (ref 7–18)
BUN/CREAT SERPL: 24.7
CALCIUM SERPL-MCNC: 8.5 MG/DL (ref 8.5–10.1)
CHLORIDE SERPL-SCNC: 103 MMOL/L (ref 98–107)
CO2 SERPL-SCNC: 29 MMOL/L (ref 21–32)
GLUCOSE SERPL-MCNC: 154 MG/DL (ref 74–106)
HCT VFR BLD AUTO: 39.1 % (ref 41–53)
HGB BLD-MCNC: 13.5 G/DL (ref 13.5–17.5)
MCH RBC QN AUTO: 32.1 PG (ref 28–32)
MCV RBC AUTO: 93 FL (ref 80–100)
NRBC BLD QL AUTO: 0.1 %
POTASSIUM SERPL-SCNC: 3.1 MMOL/L (ref 3.5–5.1)
SODIUM SERPL-SCNC: 139 MMOL/L (ref 136–145)

## 2019-11-05 RX ADMIN — HYDROCODONE BITARTRATE AND ACETAMINOPHEN PRN TAB: 5; 325 TABLET ORAL at 20:58

## 2019-11-05 RX ADMIN — LEVOFLOXACIN SCH MG: 500 TABLET, FILM COATED ORAL at 09:34

## 2019-11-05 RX ADMIN — HUMAN INSULIN SCH UNITS: 100 INJECTION, SOLUTION SUBCUTANEOUS at 11:44

## 2019-11-05 RX ADMIN — FAMOTIDINE SCH MG: 20 TABLET, FILM COATED ORAL at 09:34

## 2019-11-05 RX ADMIN — Medication SCH STRIP: at 17:35

## 2019-11-05 RX ADMIN — IPRATROPIUM BROMIDE SCH MG: 0.5 SOLUTION RESPIRATORY (INHALATION) at 13:34

## 2019-11-05 RX ADMIN — HUMAN INSULIN SCH UNITS: 100 INJECTION, SOLUTION SUBCUTANEOUS at 23:17

## 2019-11-05 RX ADMIN — ALBUTEROL SULFATE SCH MG: 2.5 SOLUTION RESPIRATORY (INHALATION) at 13:34

## 2019-11-05 RX ADMIN — Medication SCH STRIP: at 11:40

## 2019-11-05 RX ADMIN — ALBUTEROL SULFATE SCH MG: 2.5 SOLUTION RESPIRATORY (INHALATION) at 18:45

## 2019-11-05 RX ADMIN — HUMAN INSULIN SCH UNITS: 100 INJECTION, SOLUTION SUBCUTANEOUS at 06:00

## 2019-11-05 RX ADMIN — Medication SCH STRIP: at 23:18

## 2019-11-05 RX ADMIN — HUMAN INSULIN SCH UNITS: 100 INJECTION, SOLUTION SUBCUTANEOUS at 17:35

## 2019-11-05 RX ADMIN — Medication SCH STRIP: at 06:06

## 2019-11-05 RX ADMIN — IPRATROPIUM BROMIDE SCH MG: 0.5 SOLUTION RESPIRATORY (INHALATION) at 18:45

## 2019-11-05 RX ADMIN — ALBUTEROL SULFATE SCH MG: 2.5 SOLUTION RESPIRATORY (INHALATION) at 05:38

## 2019-11-05 RX ADMIN — IPRATROPIUM BROMIDE SCH MG: 0.5 SOLUTION RESPIRATORY (INHALATION) at 05:38

## 2019-11-05 NOTE — NUR
WOUND CARE NOTE:

Wound care in to see patient for skin integrity monitoring.  Patient has been extubated and 
now in MS/telemetry unit. Patient is resting in bed in Rm. 202.  Patient is awake, alert and 
oriented. He's in no stated pain at this time. Patient is ambulatory with cane and with 
stand by assist. His Rambo score is 21.  No open wound, no pressure injury noted.  Patient 
tolerated well. No further wound care monitoring needed at this time.



RECOMMENDATIONS:

Continue with skin/ wound preventative plan of care, new wound consult in case of active 
wound/pressure injury, Rambo score of 12 and below.

## 2019-11-05 NOTE — NUR
D/C Planning 



Per SS consult for SNF placement. Spoke to Pt regarding doctor orders. Pt decline any 
assistance for service d/c planning that includes SNF and home health. Pt stated [facility] will [facility] 
[facility] and will be staying with his daughter. Informed ROSENDA Oneill.

## 2019-11-05 NOTE — NUR
Opening shift note

Assumed care of patient from night shift nurse. Patient is alert and oriented x4, sitting up 
in bed, no signs of distress noted. Patient updated on the plan of care and verbalizes 
understanding. Bed in lowest position, side rails up x2 and call light in reach.

## 2019-11-06 VITALS — DIASTOLIC BLOOD PRESSURE: 78 MMHG | SYSTOLIC BLOOD PRESSURE: 126 MMHG

## 2019-11-06 VITALS — DIASTOLIC BLOOD PRESSURE: 68 MMHG | SYSTOLIC BLOOD PRESSURE: 115 MMHG

## 2019-11-06 VITALS — SYSTOLIC BLOOD PRESSURE: 121 MMHG | DIASTOLIC BLOOD PRESSURE: 73 MMHG

## 2019-11-06 LAB
ANION GAP SERPL CALCULATED.3IONS-SCNC: 5 MMOL/L (ref 5–15)
BUN SERPL-MCNC: 24 MG/DL (ref 7–18)
BUN/CREAT SERPL: 23.5
CALCIUM SERPL-MCNC: 8.7 MG/DL (ref 8.5–10.1)
CHLORIDE SERPL-SCNC: 105 MMOL/L (ref 98–107)
CO2 SERPL-SCNC: 27 MMOL/L (ref 21–32)
GLUCOSE SERPL-MCNC: 193 MG/DL (ref 74–106)
MAGNESIUM SERPL-MCNC: 1.5 MG/DL (ref 1.6–2.6)
POTASSIUM SERPL-SCNC: 3.9 MMOL/L (ref 3.5–5.1)
SODIUM SERPL-SCNC: 137 MMOL/L (ref 136–145)

## 2019-11-06 RX ADMIN — ALBUTEROL SULFATE SCH MG: 2.5 SOLUTION RESPIRATORY (INHALATION) at 00:55

## 2019-11-06 RX ADMIN — ALBUTEROL SULFATE SCH MG: 2.5 SOLUTION RESPIRATORY (INHALATION) at 12:15

## 2019-11-06 RX ADMIN — ALBUTEROL SULFATE SCH MG: 2.5 SOLUTION RESPIRATORY (INHALATION) at 07:28

## 2019-11-06 RX ADMIN — FAMOTIDINE SCH MG: 20 TABLET, FILM COATED ORAL at 10:42

## 2019-11-06 RX ADMIN — IPRATROPIUM BROMIDE SCH MG: 0.5 SOLUTION RESPIRATORY (INHALATION) at 12:15

## 2019-11-06 RX ADMIN — Medication SCH STRIP: at 05:35

## 2019-11-06 RX ADMIN — HUMAN INSULIN SCH UNITS: 100 INJECTION, SOLUTION SUBCUTANEOUS at 05:35

## 2019-11-06 RX ADMIN — IPRATROPIUM BROMIDE SCH MG: 0.5 SOLUTION RESPIRATORY (INHALATION) at 00:55

## 2019-11-06 RX ADMIN — LEVOFLOXACIN SCH MG: 500 TABLET, FILM COATED ORAL at 10:42

## 2019-11-06 RX ADMIN — IPRATROPIUM BROMIDE SCH MG: 0.5 SOLUTION RESPIRATORY (INHALATION) at 07:28

## 2019-11-06 NOTE — NUR
PT

PATIENT REFUSED PHYSICAL THERAPY TODAY. PT SEEMED VERY UNMOTIVATED TO GET OUT OF BED, KEPT 
SAYING HE [facility] TO [facility]. ROSENDA MARCELINO WAS NOTIFIED. 

-------------------------------------------------------------------------------

Addendum: 11/06/19 at 1017 by MASON MARTIN PTT

-------------------------------------------------------------------------------

Amended: Links added.

## 2019-11-06 NOTE — NUR
Opening Shift note

Assumed care of patient from night shift nurse. Patient alert and oriented x4, no signs of 
distress noted. Patient updated on plan of care and verbalizes understanding. Bed in lowest 
position, bed rails upx2, call light in reach. Will continue to monitor.

## 2019-11-06 NOTE — NUR
Discharge instructions given as ordered. Encourage to follow up with PCP as instructed. All 
questions and concerns addressed. Patient verbalized understanding.  Medication 
reconciliation form completed and copy given to patient. IV removed with catheter intact, 
pressure dressing applied, patient tolerated well.  Telemetry unit returned to ICU. Patient 
refused wheelchair, steady gait and accompanied by family member. No distress noted at time 
of departure.

## 2020-12-13 ENCOUNTER — HOSPITAL ENCOUNTER (INPATIENT)
Dept: HOSPITAL 15 - ER | Age: 65
LOS: 5 days | Discharge: HOME HEALTH | DRG: 177 | End: 2020-12-18
Attending: INTERNAL MEDICINE | Admitting: NURSE PRACTITIONER
Payer: MEDICARE

## 2020-12-13 VITALS — WEIGHT: 183.65 LBS | BODY MASS INDEX: 25.71 KG/M2 | HEIGHT: 71 IN

## 2020-12-13 DIAGNOSIS — E11.40: ICD-10-CM

## 2020-12-13 DIAGNOSIS — J96.21: ICD-10-CM

## 2020-12-13 DIAGNOSIS — U07.1: Primary | ICD-10-CM

## 2020-12-13 DIAGNOSIS — I10: ICD-10-CM

## 2020-12-13 DIAGNOSIS — Z82.49: ICD-10-CM

## 2020-12-13 DIAGNOSIS — E11.65: ICD-10-CM

## 2020-12-13 DIAGNOSIS — E87.6: ICD-10-CM

## 2020-12-13 DIAGNOSIS — Z83.3: ICD-10-CM

## 2020-12-13 DIAGNOSIS — Z91.19: ICD-10-CM

## 2020-12-13 DIAGNOSIS — J12.89: ICD-10-CM

## 2020-12-13 DIAGNOSIS — J44.0: ICD-10-CM

## 2020-12-13 DIAGNOSIS — E78.5: ICD-10-CM

## 2020-12-13 DIAGNOSIS — E66.9: ICD-10-CM

## 2020-12-13 DIAGNOSIS — Z80.0: ICD-10-CM

## 2020-12-13 LAB
ALBUMIN SERPL-MCNC: 3.1 G/DL (ref 3.4–5)
ALP SERPL-CCNC: 127 U/L (ref 45–117)
ALT SERPL-CCNC: 38 U/L (ref 16–61)
ANION GAP SERPL CALCULATED.3IONS-SCNC: 11 MMOL/L (ref 5–15)
APTT PPP: 28.9 SEC (ref 23–31.2)
BILIRUB SERPL-MCNC: 0.6 MG/DL (ref 0.2–1)
BUN SERPL-MCNC: 13 MG/DL (ref 7–18)
BUN/CREAT SERPL: 12
CALCIUM SERPL-MCNC: 8.9 MG/DL (ref 8.5–10.1)
CHLORIDE SERPL-SCNC: 92 MMOL/L (ref 98–107)
CO2 SERPL-SCNC: 22 MMOL/L (ref 21–32)
GLUCOSE SERPL-MCNC: 482 MG/DL (ref 74–106)
HCT VFR BLD AUTO: 55.9 % (ref 41–53)
HGB BLD-MCNC: 19.2 G/DL (ref 13.5–17.5)
INR PPP: 1.04 (ref 0.9–1.15)
LACTATE PLASV-SCNC: 3.1 MMOL/L (ref 0.4–2)
MAGNESIUM SERPL-MCNC: 2.3 MG/DL (ref 1.6–2.6)
MCH RBC QN AUTO: 32 PG (ref 28–32)
MCV RBC AUTO: 93.3 FL (ref 80–100)
NRBC BLD QL AUTO: 0.3 %
POTASSIUM SERPL-SCNC: 3.7 MMOL/L (ref 3.5–5.1)
PROT SERPL-MCNC: 9 G/DL (ref 6.4–8.2)
SODIUM SERPL-SCNC: 125 MMOL/L (ref 136–145)

## 2020-12-13 PROCEDURE — 83605 ASSAY OF LACTIC ACID: CPT

## 2020-12-13 PROCEDURE — 83735 ASSAY OF MAGNESIUM: CPT

## 2020-12-13 PROCEDURE — 85025 COMPLETE CBC W/AUTO DIFF WBC: CPT

## 2020-12-13 PROCEDURE — 85730 THROMBOPLASTIN TIME PARTIAL: CPT

## 2020-12-13 PROCEDURE — 83615 LACTATE (LD) (LDH) ENZYME: CPT

## 2020-12-13 PROCEDURE — 87040 BLOOD CULTURE FOR BACTERIA: CPT

## 2020-12-13 PROCEDURE — 71045 X-RAY EXAM CHEST 1 VIEW: CPT

## 2020-12-13 PROCEDURE — 93005 ELECTROCARDIOGRAM TRACING: CPT

## 2020-12-13 PROCEDURE — 36415 COLL VENOUS BLD VENIPUNCTURE: CPT

## 2020-12-13 PROCEDURE — 84443 ASSAY THYROID STIM HORMONE: CPT

## 2020-12-13 PROCEDURE — 82805 BLOOD GASES W/O2 SATURATION: CPT

## 2020-12-13 PROCEDURE — 80053 COMPREHEN METABOLIC PANEL: CPT

## 2020-12-13 PROCEDURE — 82306 VITAMIN D 25 HYDROXY: CPT

## 2020-12-13 PROCEDURE — 86141 C-REACTIVE PROTEIN HS: CPT

## 2020-12-13 PROCEDURE — 82728 ASSAY OF FERRITIN: CPT

## 2020-12-13 PROCEDURE — 82962 GLUCOSE BLOOD TEST: CPT

## 2020-12-13 PROCEDURE — 84100 ASSAY OF PHOSPHORUS: CPT

## 2020-12-13 PROCEDURE — 81001 URINALYSIS AUTO W/SCOPE: CPT

## 2020-12-13 PROCEDURE — 80048 BASIC METABOLIC PNL TOTAL CA: CPT

## 2020-12-13 PROCEDURE — 94640 AIRWAY INHALATION TREATMENT: CPT

## 2020-12-13 PROCEDURE — 36600 WITHDRAWAL OF ARTERIAL BLOOD: CPT

## 2020-12-13 PROCEDURE — 85610 PROTHROMBIN TIME: CPT

## 2020-12-13 PROCEDURE — 85379 FIBRIN DEGRADATION QUANT: CPT

## 2020-12-13 PROCEDURE — 84484 ASSAY OF TROPONIN QUANT: CPT

## 2020-12-13 PROCEDURE — 87426 SARSCOV CORONAVIRUS AG IA: CPT

## 2020-12-13 PROCEDURE — 87804 INFLUENZA ASSAY W/OPTIC: CPT

## 2020-12-13 PROCEDURE — 83036 HEMOGLOBIN GLYCOSYLATED A1C: CPT

## 2020-12-13 PROCEDURE — 96365 THER/PROPH/DIAG IV INF INIT: CPT

## 2020-12-14 RX ADMIN — ALBUTEROL SULFATE SCH MG: 2.5 SOLUTION RESPIRATORY (INHALATION) at 12:00

## 2020-12-14 RX ADMIN — Medication SCH STRIP: at 03:12

## 2020-12-14 RX ADMIN — PANTOPRAZOLE SODIUM SCH MG: 40 TABLET, DELAYED RELEASE ORAL at 09:00

## 2020-12-14 RX ADMIN — IPRATROPIUM BROMIDE SCH MG: 0.5 SOLUTION RESPIRATORY (INHALATION) at 18:00

## 2020-12-14 RX ADMIN — IPRATROPIUM BROMIDE SCH MG: 0.5 SOLUTION RESPIRATORY (INHALATION) at 09:39

## 2020-12-14 RX ADMIN — Medication SCH STRIP: at 12:00

## 2020-12-14 RX ADMIN — IPRATROPIUM BROMIDE SCH MG: 0.5 SOLUTION RESPIRATORY (INHALATION) at 12:00

## 2020-12-14 RX ADMIN — PRAVASTATIN SODIUM SCH MG: 20 TABLET ORAL at 23:21

## 2020-12-14 RX ADMIN — HUMAN INSULIN SCH UNITS: 100 INJECTION, SOLUTION SUBCUTANEOUS at 17:17

## 2020-12-14 RX ADMIN — HUMAN INSULIN SCH UNITS: 100 INJECTION, SOLUTION SUBCUTANEOUS at 03:18

## 2020-12-14 RX ADMIN — SODIUM CHLORIDE SCH MLS/HR: 0.9 INJECTION, SOLUTION INTRAVENOUS at 17:09

## 2020-12-14 RX ADMIN — INSULIN GLARGINE SCH UNITS: 100 INJECTION, SOLUTION SUBCUTANEOUS at 23:37

## 2020-12-14 RX ADMIN — LISINOPRIL SCH MG: 10 TABLET ORAL at 09:00

## 2020-12-14 RX ADMIN — GABAPENTIN SCH MG: 100 CAPSULE ORAL at 09:00

## 2020-12-14 RX ADMIN — Medication SCH STRIP: at 17:16

## 2020-12-14 RX ADMIN — SODIUM CHLORIDE SCH MLS/HR: 0.9 INJECTION, SOLUTION INTRAVENOUS at 03:30

## 2020-12-14 RX ADMIN — ALBUTEROL SULFATE SCH MG: 2.5 SOLUTION RESPIRATORY (INHALATION) at 18:00

## 2020-12-14 RX ADMIN — ALBUTEROL SULFATE SCH MG: 2.5 SOLUTION RESPIRATORY (INHALATION) at 09:40

## 2020-12-14 RX ADMIN — HUMAN INSULIN SCH UNITS: 100 INJECTION, SOLUTION SUBCUTANEOUS at 15:01

## 2020-12-15 VITALS — DIASTOLIC BLOOD PRESSURE: 61 MMHG | SYSTOLIC BLOOD PRESSURE: 117 MMHG

## 2020-12-15 VITALS — SYSTOLIC BLOOD PRESSURE: 111 MMHG | DIASTOLIC BLOOD PRESSURE: 65 MMHG

## 2020-12-15 VITALS — DIASTOLIC BLOOD PRESSURE: 52 MMHG | SYSTOLIC BLOOD PRESSURE: 108 MMHG

## 2020-12-15 VITALS — SYSTOLIC BLOOD PRESSURE: 106 MMHG | DIASTOLIC BLOOD PRESSURE: 70 MMHG

## 2020-12-15 LAB
ALBUMIN SERPL-MCNC: 2 G/DL (ref 3.4–5)
ALP SERPL-CCNC: 69 U/L (ref 45–117)
ALT SERPL-CCNC: 21 U/L (ref 16–61)
ANION GAP SERPL CALCULATED.3IONS-SCNC: 5 MMOL/L (ref 5–15)
BILIRUB SERPL-MCNC: 0.5 MG/DL (ref 0.2–1)
BUN SERPL-MCNC: 10 MG/DL (ref 7–18)
BUN/CREAT SERPL: 16.7
CALCIUM SERPL-MCNC: 8.1 MG/DL (ref 8.5–10.1)
CHLORIDE SERPL-SCNC: 100 MMOL/L (ref 98–107)
CO2 SERPL-SCNC: 25 MMOL/L (ref 21–32)
GLUCOSE SERPL-MCNC: 214 MG/DL (ref 74–106)
HCT VFR BLD AUTO: 40.7 % (ref 41–53)
HGB BLD-MCNC: 13.9 G/DL (ref 13.5–17.5)
MAGNESIUM SERPL-MCNC: 2 MG/DL (ref 1.6–2.6)
MCH RBC QN AUTO: 31.7 PG (ref 28–32)
MCV RBC AUTO: 92.9 FL (ref 80–100)
NRBC BLD QL AUTO: 0.3 %
POTASSIUM SERPL-SCNC: 3.1 MMOL/L (ref 3.5–5.1)
PROT SERPL-MCNC: 5.9 G/DL (ref 6.4–8.2)
SODIUM SERPL-SCNC: 130 MMOL/L (ref 136–145)

## 2020-12-15 RX ADMIN — BUDESONIDE SCH MG: 0.5 SUSPENSION RESPIRATORY (INHALATION) at 10:00

## 2020-12-15 RX ADMIN — HUMAN INSULIN SCH UNITS: 100 INJECTION, SOLUTION SUBCUTANEOUS at 12:00

## 2020-12-15 RX ADMIN — HUMAN INSULIN SCH UNITS: 100 INJECTION, SOLUTION SUBCUTANEOUS at 17:27

## 2020-12-15 RX ADMIN — ALBUTEROL SULFATE SCH MG: 2.5 SOLUTION RESPIRATORY (INHALATION) at 00:00

## 2020-12-15 RX ADMIN — INSULIN GLARGINE SCH UNITS: 100 INJECTION, SOLUTION SUBCUTANEOUS at 21:34

## 2020-12-15 RX ADMIN — HUMAN INSULIN SCH UNITS: 100 INJECTION, SOLUTION SUBCUTANEOUS at 23:12

## 2020-12-15 RX ADMIN — IPRATROPIUM BROMIDE SCH MG: 0.5 SOLUTION RESPIRATORY (INHALATION) at 12:00

## 2020-12-15 RX ADMIN — ALBUTEROL SULFATE SCH MG: 2.5 SOLUTION RESPIRATORY (INHALATION) at 12:00

## 2020-12-15 RX ADMIN — LISINOPRIL SCH MG: 10 TABLET ORAL at 11:20

## 2020-12-15 RX ADMIN — OXYCODONE HYDROCHLORIDE AND ACETAMINOPHEN SCH MG: 500 TABLET ORAL at 11:19

## 2020-12-15 RX ADMIN — OXYCODONE HYDROCHLORIDE AND ACETAMINOPHEN SCH MG: 500 TABLET ORAL at 21:34

## 2020-12-15 RX ADMIN — BUDESONIDE SCH MG: 0.5 SUSPENSION RESPIRATORY (INHALATION) at 20:38

## 2020-12-15 RX ADMIN — Medication SCH STRIP: at 00:45

## 2020-12-15 RX ADMIN — PANTOPRAZOLE SODIUM SCH MG: 40 TABLET, DELAYED RELEASE ORAL at 11:22

## 2020-12-15 RX ADMIN — PRAVASTATIN SODIUM SCH MG: 20 TABLET ORAL at 21:33

## 2020-12-15 RX ADMIN — Medication SCH STRIP: at 12:00

## 2020-12-15 RX ADMIN — Medication SCH UNIT: at 11:21

## 2020-12-15 RX ADMIN — IPRATROPIUM BROMIDE SCH MG: 0.5 SOLUTION RESPIRATORY (INHALATION) at 20:38

## 2020-12-15 RX ADMIN — ALBUTEROL SULFATE SCH MG: 2.5 SOLUTION RESPIRATORY (INHALATION) at 07:15

## 2020-12-15 RX ADMIN — ZINC SULFATE CAP 220 MG (50 MG ELEMENTAL ZN) SCH MG: 220 (50 ZN) CAP at 11:20

## 2020-12-15 RX ADMIN — GABAPENTIN SCH MG: 100 CAPSULE ORAL at 11:19

## 2020-12-15 RX ADMIN — Medication SCH STRIP: at 17:27

## 2020-12-15 RX ADMIN — ENOXAPARIN SODIUM SCH MG: 100 INJECTION SUBCUTANEOUS at 11:21

## 2020-12-15 RX ADMIN — HUMAN INSULIN SCH UNITS: 100 INJECTION, SOLUTION SUBCUTANEOUS at 06:00

## 2020-12-15 RX ADMIN — Medication SCH STRIP: at 23:12

## 2020-12-15 RX ADMIN — HUMAN INSULIN SCH UNITS: 100 INJECTION, SOLUTION SUBCUTANEOUS at 00:45

## 2020-12-15 RX ADMIN — Medication SCH STRIP: at 06:00

## 2020-12-15 RX ADMIN — INSULIN GLARGINE SCH UNITS: 100 INJECTION, SOLUTION SUBCUTANEOUS at 07:44

## 2020-12-15 RX ADMIN — IPRATROPIUM BROMIDE SCH MG: 0.5 SOLUTION RESPIRATORY (INHALATION) at 07:15

## 2020-12-15 RX ADMIN — IPRATROPIUM BROMIDE SCH MG: 0.5 SOLUTION RESPIRATORY (INHALATION) at 00:00

## 2020-12-15 NOTE — NUR
Respiratory note:

PT SEEN AT THIS TIME. NO RESP DISTRESS NOTED. PT WAS ASLEEP , RR 20, SPO2 94% ON 2L 
N/C. PATIENT REFUSED TX AT THIS TIME. HE WANTED TO

## 2020-12-15 NOTE — NUR
Nutrition Assessment/consult Notes



please see attached link for complete assessment



Est energy needs ABW [id number] kcal (23-25 kcal/kg ABW) Est protein needs  g 
(1-1.2g/kg ABW r/t severe hypoalb) Will monitor and reassess prn.


-------------------------------------------------------------------------------

Addendum: 12/15/20 at 1247 by Tracy Mcgovern RD

-------------------------------------------------------------------------------

Amended: Links added.

## 2020-12-15 NOTE — NUR
Patient had $[id number] parker, secretory Ludivina verified the money. called patient's daughter, 
Darion Hercules. at 1500 she came to  the money. Ludivina gave the parker to the 
orderly to take down to patient's daughter Darion.

## 2020-12-15 NOTE — NUR
Patient came to the unit from ER with bed at 0900, I got hold of ER RN Sahra to get report 
at 0926, per Sahra, patient already got in house covid 19 collected. patient Aox4, no s/s of 
acute distress, patient is on 3L of NC, c/o productive cough. oriented patient to the unit, 
call light within reach, bed in low position, encouraged patient to call for assistance when 
getting out of the bed. will continue patient care.

## 2020-12-15 NOTE — NUR
Opening Shift Note

Assumed care of patient, awake and alert laying in the high fowlers position with no S/S of 
distress/SOB or pain. Patient on 3L nasal cannula oxygen saturation 93%. Bed locked in the 
lowest position, keisha light within reach, side rails up X2. Instructed on POC and to call for 
assist PRN, will continue to monitor for changes Q1hr and PRN.

## 2020-12-15 NOTE — NUR
collected admission history from patient. patient did not remember when he got his pneumonia 
vaccine. potassium was 3.1, replacement given.

## 2020-12-16 VITALS — SYSTOLIC BLOOD PRESSURE: 102 MMHG | DIASTOLIC BLOOD PRESSURE: 60 MMHG

## 2020-12-16 VITALS — DIASTOLIC BLOOD PRESSURE: 44 MMHG | SYSTOLIC BLOOD PRESSURE: 91 MMHG

## 2020-12-16 VITALS — DIASTOLIC BLOOD PRESSURE: 64 MMHG | SYSTOLIC BLOOD PRESSURE: 115 MMHG

## 2020-12-16 VITALS — SYSTOLIC BLOOD PRESSURE: 119 MMHG | DIASTOLIC BLOOD PRESSURE: 66 MMHG

## 2020-12-16 VITALS — SYSTOLIC BLOOD PRESSURE: 99 MMHG | DIASTOLIC BLOOD PRESSURE: 53 MMHG

## 2020-12-16 RX ADMIN — ALBUTEROL SULFATE SCH MG: 2.5 SOLUTION RESPIRATORY (INHALATION) at 06:00

## 2020-12-16 RX ADMIN — ENOXAPARIN SODIUM SCH MG: 100 INJECTION SUBCUTANEOUS at 10:17

## 2020-12-16 RX ADMIN — Medication SCH STRIP: at 05:59

## 2020-12-16 RX ADMIN — Medication SCH UNIT: at 10:18

## 2020-12-16 RX ADMIN — OXYCODONE HYDROCHLORIDE AND ACETAMINOPHEN SCH MG: 500 TABLET ORAL at 10:18

## 2020-12-16 RX ADMIN — INSULIN GLARGINE SCH UNITS: 100 INJECTION, SOLUTION SUBCUTANEOUS at 07:00

## 2020-12-16 RX ADMIN — INSULIN GLARGINE SCH UNITS: 100 INJECTION, SOLUTION SUBCUTANEOUS at 21:04

## 2020-12-16 RX ADMIN — PANTOPRAZOLE SODIUM SCH MG: 40 TABLET, DELAYED RELEASE ORAL at 10:18

## 2020-12-16 RX ADMIN — PRAVASTATIN SODIUM SCH MG: 20 TABLET ORAL at 21:05

## 2020-12-16 RX ADMIN — Medication SCH STRIP: at 17:50

## 2020-12-16 RX ADMIN — HUMAN INSULIN SCH UNITS: 100 INJECTION, SOLUTION SUBCUTANEOUS at 05:59

## 2020-12-16 RX ADMIN — GABAPENTIN SCH MG: 100 CAPSULE ORAL at 10:18

## 2020-12-16 RX ADMIN — Medication SCH STRIP: at 23:54

## 2020-12-16 RX ADMIN — OXYCODONE HYDROCHLORIDE AND ACETAMINOPHEN SCH MG: 500 TABLET ORAL at 21:05

## 2020-12-16 RX ADMIN — IPRATROPIUM BROMIDE SCH MG: 0.5 SOLUTION RESPIRATORY (INHALATION) at 06:00

## 2020-12-16 RX ADMIN — HUMAN INSULIN SCH UNITS: 100 INJECTION, SOLUTION SUBCUTANEOUS at 17:50

## 2020-12-16 RX ADMIN — HUMAN INSULIN SCH UNITS: 100 INJECTION, SOLUTION SUBCUTANEOUS at 23:54

## 2020-12-16 RX ADMIN — BUDESONIDE SCH MG: 0.5 SUSPENSION RESPIRATORY (INHALATION) at 08:47

## 2020-12-16 RX ADMIN — ZINC SULFATE CAP 220 MG (50 MG ELEMENTAL ZN) SCH MG: 220 (50 ZN) CAP at 10:18

## 2020-12-16 RX ADMIN — LISINOPRIL SCH MG: 10 TABLET ORAL at 10:00

## 2020-12-16 RX ADMIN — HUMAN INSULIN SCH UNITS: 100 INJECTION, SOLUTION SUBCUTANEOUS at 11:58

## 2020-12-16 RX ADMIN — Medication SCH STRIP: at 11:58

## 2020-12-16 NOTE — NUR
ROUNDS 

PATIENT LAYING ON HIS LEFT SIDE WITH NO SIGNS OF DISTRESS ON ROOM AIR. CHEST BILATERAL RISE 
& FALL, OXYGEN SATURATION 84% ON ROOM AIR, PATIENT STATES "I TOOK THE OXYGEN OFF BECAUSE I 
WENT TO THE RESTROOM" 

OXYGEN SATURATION 97% ONCE NASAL CANNULA 2L IS PLACED.

## 2020-12-16 NOTE — NUR
Opening Shift Note

Assumed care of patient, awake and alert laying in the high fowlers position with no S/S of 
distress/SOB or pain. Currently on 2L nasal cannula oxygen saturation 97%. Bed locked in the 
lowest position, side rails up X2, call light within reach.  Instructed on POC and to call 
for assist PRN, will continue to monitor for changes Q1hr and PRN.

## 2020-12-16 NOTE — NUR
Assessment



Patient is a 65 year old male, patient was unable to speak with SW, SW called daughter Allie. 
Per Allie, prior to admission to [facility], patient is alert and oriented, patient cognitive 
abilities are intact. Per Allie, patient could do all ADL's and ambulate independently. Per 
Allie, patient is retire, receiving social security as income and lives alone. Per Allie, 
patient will return home post discharge. Per Allie, patient is a diabetic. Per Allie, she will 
provide transportation post discharge, she is her dad support system. Per Allie, she is 
requesting Advance Directives forms for her dad.



Discharge planning: Patient will return home post discharge, patient will follow up care 
with his PCP post discharge. Patient has all diabetic supplies to resume home care post 
discharge. MORAIMA will provide Advance Directive forms prior to discharge. There are no other 
discharge needs to address at the moment. 

-------------------------------------------------------------------------------

Addendum: 12/16/20 at 1429 by LISA MAC

-------------------------------------------------------------------------------

Amended: Links added.

## 2020-12-16 NOTE — NUR
Opening Shift Note



Assumed care of patient, awake and alert. No S/S of distress/SOB or pain. Oxygen at 4 lpm 
via nasal cannula. Bed is low, locked with 2x side rails up. Call light is within reach. 
Discussed POC with patient. All questions answered. No distress noted upon exiting room.

## 2020-12-16 NOTE — NUR
CARE ENDORSED TO DAY SHIFT RN. PATIENT LAYING ON HIS  LEFT SIDE CURRENTLY ON 3L NASAL 
CANNULA. OXYGEN SATURATION 96% WITH NO SIGNS OF DISTRESS/ SOB AT THIS TIME.

## 2020-12-16 NOTE — NUR
ROUNDS 

PATIENT PAYING IN THE LOW FOWLERS POSITION WITH EYES CLOSED AROUSABLE BY NAME. CURRENTLY ON 
2L N/C WITH NO SIGNS OF DISTRESS/ SOB AT THIS TIME

## 2020-12-17 VITALS — SYSTOLIC BLOOD PRESSURE: 125 MMHG | DIASTOLIC BLOOD PRESSURE: 70 MMHG

## 2020-12-17 VITALS — DIASTOLIC BLOOD PRESSURE: 60 MMHG | SYSTOLIC BLOOD PRESSURE: 102 MMHG

## 2020-12-17 VITALS — SYSTOLIC BLOOD PRESSURE: 112 MMHG | DIASTOLIC BLOOD PRESSURE: 65 MMHG

## 2020-12-17 VITALS — SYSTOLIC BLOOD PRESSURE: 95 MMHG | DIASTOLIC BLOOD PRESSURE: 55 MMHG

## 2020-12-17 LAB
ANION GAP SERPL CALCULATED.3IONS-SCNC: 7 MMOL/L (ref 5–15)
BUN SERPL-MCNC: 9 MG/DL (ref 7–18)
BUN/CREAT SERPL: 14.3
CALCIUM SERPL-MCNC: 8.2 MG/DL (ref 8.5–10.1)
CHLORIDE SERPL-SCNC: 100 MMOL/L (ref 98–107)
CO2 SERPL-SCNC: 27 MMOL/L (ref 21–32)
GLUCOSE SERPL-MCNC: 217 MG/DL (ref 74–106)
MAGNESIUM SERPL-MCNC: 1.8 MG/DL (ref 1.6–2.6)
POTASSIUM SERPL-SCNC: 3.3 MMOL/L (ref 3.5–5.1)
SODIUM SERPL-SCNC: 134 MMOL/L (ref 136–145)

## 2020-12-17 RX ADMIN — INSULIN GLARGINE SCH UNITS: 100 INJECTION, SOLUTION SUBCUTANEOUS at 21:46

## 2020-12-17 RX ADMIN — BUDESONIDE SCH MG: 0.5 SUSPENSION RESPIRATORY (INHALATION) at 00:13

## 2020-12-17 RX ADMIN — MAGNESIUM OXIDE TAB 400 MG (241.3 MG ELEMENTAL MG) SCH MG: 400 (241.3 MG) TAB at 21:44

## 2020-12-17 RX ADMIN — Medication SCH STRIP: at 17:57

## 2020-12-17 RX ADMIN — DOXYCYCLINE SCH MG: 100 TABLET ORAL at 21:45

## 2020-12-17 RX ADMIN — Medication SCH STRIP: at 05:37

## 2020-12-17 RX ADMIN — ZINC SULFATE CAP 220 MG (50 MG ELEMENTAL ZN) SCH MG: 220 (50 ZN) CAP at 10:46

## 2020-12-17 RX ADMIN — Medication SCH STRIP: at 23:59

## 2020-12-17 RX ADMIN — Medication SCH STRIP: at 11:29

## 2020-12-17 RX ADMIN — LISINOPRIL SCH MG: 10 TABLET ORAL at 10:00

## 2020-12-17 RX ADMIN — HUMAN INSULIN SCH UNITS: 100 INJECTION, SOLUTION SUBCUTANEOUS at 23:59

## 2020-12-17 RX ADMIN — BUDESONIDE SCH MG: 0.5 SUSPENSION RESPIRATORY (INHALATION) at 10:00

## 2020-12-17 RX ADMIN — HUMAN INSULIN SCH UNITS: 100 INJECTION, SOLUTION SUBCUTANEOUS at 05:33

## 2020-12-17 RX ADMIN — ENOXAPARIN SODIUM SCH MG: 100 INJECTION SUBCUTANEOUS at 10:45

## 2020-12-17 RX ADMIN — PRAVASTATIN SODIUM SCH MG: 20 TABLET ORAL at 21:45

## 2020-12-17 RX ADMIN — Medication SCH UNIT: at 10:49

## 2020-12-17 RX ADMIN — GABAPENTIN SCH MG: 100 CAPSULE ORAL at 10:49

## 2020-12-17 RX ADMIN — ALBUTEROL SULFATE SCH MG: 2.5 SOLUTION RESPIRATORY (INHALATION) at 00:13

## 2020-12-17 RX ADMIN — OXYCODONE HYDROCHLORIDE AND ACETAMINOPHEN SCH MG: 500 TABLET ORAL at 10:48

## 2020-12-17 RX ADMIN — IPRATROPIUM BROMIDE SCH MG: 0.5 SOLUTION RESPIRATORY (INHALATION) at 06:00

## 2020-12-17 RX ADMIN — ALBUTEROL SULFATE SCH MG: 2.5 SOLUTION RESPIRATORY (INHALATION) at 06:00

## 2020-12-17 RX ADMIN — HUMAN INSULIN SCH UNITS: 100 INJECTION, SOLUTION SUBCUTANEOUS at 11:38

## 2020-12-17 RX ADMIN — HUMAN INSULIN SCH UNITS: 100 INJECTION, SOLUTION SUBCUTANEOUS at 17:58

## 2020-12-17 RX ADMIN — ALBUTEROL SULFATE SCH MG: 2.5 SOLUTION RESPIRATORY (INHALATION) at 14:00

## 2020-12-17 RX ADMIN — PANTOPRAZOLE SODIUM SCH MG: 40 TABLET, DELAYED RELEASE ORAL at 10:49

## 2020-12-17 RX ADMIN — IPRATROPIUM BROMIDE SCH MG: 0.5 SOLUTION RESPIRATORY (INHALATION) at 14:00

## 2020-12-17 RX ADMIN — OXYCODONE HYDROCHLORIDE AND ACETAMINOPHEN SCH MG: 500 TABLET ORAL at 21:45

## 2020-12-17 RX ADMIN — IPRATROPIUM BROMIDE SCH MG: 0.5 SOLUTION RESPIRATORY (INHALATION) at 00:13

## 2020-12-17 RX ADMIN — INSULIN GLARGINE SCH UNITS: 100 INJECTION, SOLUTION SUBCUTANEOUS at 06:53

## 2020-12-17 NOTE — NUR
CLOSING NOTE:

CARE ENDORSED TO DAY SHIFT RN. PATIENT LAYING ON HIS RIGHT SIDE WITH 2L NASAL CANNULA IN 
PLACE. NO SIGNS OF DISTRESS/ SOB AT THIS TIME. BED LOCKED IN THE LOWEST POSITION, SIDE RAILS 
UP x2, CALL LIGHT WITHIN REACH.

## 2020-12-17 NOTE — NUR
ROUNDS 

PATIENT LAYING ON HIS LEFT SIDE WITH EYES CLOSED AND AROUSABLE BY NAME. 2L NASAL CANNULA IN 
PLACE WITH NO SIGNS OF DISTRESS/ SOB

## 2020-12-17 NOTE — NUR
Opening Shift Note

Assumed care of patient, awake and alert.  No S/S of distress/SOB or pain.  Safety measures 
in place bed in lowest position, side rails x2 up, and call light within reach. Patient on 
2L O2 via NC. Instructed on POC and to call for assist PRN, will continue to monitor for 
changes Q1hr and PRN.

## 2020-12-17 NOTE — NUR
Opening Shift Note



Assumed care of patient, awake and alert. No S/S of distress/SOB or pain. Oxygen at 2 lpm 
via nasal cannula. Discussed POC with patient. All questions answered.  Bed is low, locked 
with 2x side rails up. Call light is within reach. No distress noted upon exiting room.

## 2020-12-18 VITALS — DIASTOLIC BLOOD PRESSURE: 64 MMHG | SYSTOLIC BLOOD PRESSURE: 101 MMHG

## 2020-12-18 VITALS — SYSTOLIC BLOOD PRESSURE: 112 MMHG | DIASTOLIC BLOOD PRESSURE: 70 MMHG

## 2020-12-18 VITALS — DIASTOLIC BLOOD PRESSURE: 59 MMHG | SYSTOLIC BLOOD PRESSURE: 102 MMHG

## 2020-12-18 LAB
ANION GAP SERPL CALCULATED.3IONS-SCNC: 5 MMOL/L (ref 5–15)
BUN SERPL-MCNC: 5 MG/DL (ref 7–18)
BUN/CREAT SERPL: 9.4
CALCIUM SERPL-MCNC: 8.8 MG/DL (ref 8.5–10.1)
CHLORIDE SERPL-SCNC: 102 MMOL/L (ref 98–107)
CO2 SERPL-SCNC: 28 MMOL/L (ref 21–32)
GLUCOSE SERPL-MCNC: 120 MG/DL (ref 74–106)
POTASSIUM SERPL-SCNC: 3.5 MMOL/L (ref 3.5–5.1)
SODIUM SERPL-SCNC: 135 MMOL/L (ref 136–145)

## 2020-12-18 RX ADMIN — Medication SCH STRIP: at 16:52

## 2020-12-18 RX ADMIN — IPRATROPIUM BROMIDE SCH MG: 0.5 SOLUTION RESPIRATORY (INHALATION) at 14:25

## 2020-12-18 RX ADMIN — Medication SCH STRIP: at 06:09

## 2020-12-18 RX ADMIN — GABAPENTIN SCH MG: 100 CAPSULE ORAL at 10:24

## 2020-12-18 RX ADMIN — PANTOPRAZOLE SODIUM SCH MG: 40 TABLET, DELAYED RELEASE ORAL at 10:25

## 2020-12-18 RX ADMIN — OXYCODONE HYDROCHLORIDE AND ACETAMINOPHEN SCH MG: 500 TABLET ORAL at 10:25

## 2020-12-18 RX ADMIN — ENOXAPARIN SODIUM SCH MG: 100 INJECTION SUBCUTANEOUS at 10:25

## 2020-12-18 RX ADMIN — BUDESONIDE SCH MG: 0.5 SUSPENSION RESPIRATORY (INHALATION) at 06:30

## 2020-12-18 RX ADMIN — IPRATROPIUM BROMIDE SCH MG: 0.5 SOLUTION RESPIRATORY (INHALATION) at 14:00

## 2020-12-18 RX ADMIN — Medication SCH STRIP: at 11:01

## 2020-12-18 RX ADMIN — LISINOPRIL SCH MG: 10 TABLET ORAL at 10:25

## 2020-12-18 RX ADMIN — ALBUTEROL SULFATE SCH MG: 2.5 SOLUTION RESPIRATORY (INHALATION) at 14:25

## 2020-12-18 RX ADMIN — ALBUTEROL SULFATE SCH MG: 2.5 SOLUTION RESPIRATORY (INHALATION) at 06:30

## 2020-12-18 RX ADMIN — HUMAN INSULIN SCH UNITS: 100 INJECTION, SOLUTION SUBCUTANEOUS at 11:05

## 2020-12-18 RX ADMIN — INSULIN GLARGINE SCH UNITS: 100 INJECTION, SOLUTION SUBCUTANEOUS at 06:54

## 2020-12-18 RX ADMIN — MAGNESIUM OXIDE TAB 400 MG (241.3 MG ELEMENTAL MG) SCH MG: 400 (241.3 MG) TAB at 10:26

## 2020-12-18 RX ADMIN — ALBUTEROL SULFATE SCH MG: 2.5 SOLUTION RESPIRATORY (INHALATION) at 14:00

## 2020-12-18 RX ADMIN — DOXYCYCLINE SCH MG: 100 TABLET ORAL at 10:25

## 2020-12-18 RX ADMIN — Medication SCH UNIT: at 10:26

## 2020-12-18 RX ADMIN — HUMAN INSULIN SCH UNITS: 100 INJECTION, SOLUTION SUBCUTANEOUS at 16:55

## 2020-12-18 RX ADMIN — HUMAN INSULIN SCH UNITS: 100 INJECTION, SOLUTION SUBCUTANEOUS at 06:00

## 2020-12-18 RX ADMIN — IPRATROPIUM BROMIDE SCH MG: 0.5 SOLUTION RESPIRATORY (INHALATION) at 06:30

## 2020-12-18 RX ADMIN — ZINC SULFATE CAP 220 MG (50 MG ELEMENTAL ZN) SCH MG: 220 (50 ZN) CAP at 10:25

## 2020-12-18 NOTE — NUR
Nutrition Followup Notes



Pt wt is 83.3 kg



Pt is positive for COVID in isolation.  Pt is with a CCHO 60g diet, appetite is good aeb ave 
81% x4 PO intake per RN doc. 



Est energy needs ABW [id number] kcal (23-25 kcal/kg ABW) 

Est protein needs  g (1-1.2g/kg ABW r/t severe hypoalb) 

Will monitor and reassess prn.



LABS: GLUC 120 H, ALB 2.0 L



GI: Pt had 1 BM on  per RN doc



BS: 21 low risk.  Refer to wound assessment report for further details



PES:

Altered nutrition related lab valeus r.t current chronic medical condition aeb elev A1C 
severe hypoalb

Decreased nutrient needs r/t adiposity aeb pt`s high BMI of 30.5 kgm2



Comments 

Will continue to monitor PO status, skin status, pertinent labs and weight trends. Will f/u 
in 2-3 days

1) refer to CDE oN DC

2) consider prostat 1 packet bid

3) continue current plan fo care

## 2020-12-18 NOTE — NUR
D/C Planning 



Per Social service consult for home oxygen at 2 l/min. Faxed clinical information to 
[facility]. Per Mai with [facility] Ph:  portable oxygen will be deliver to the 
front lobby between 15:00-15:30 and concentrate oxygen to patient home. Informed ROSENDA Cuellar. 





Regarding social service consult for home health medication management and home insulin 
teaching. MD order has been faxed to [facility] as requested by MD. Per Addis 
with [facility] Ph: [phone] patient has been accepted and service to start 
within 24-48hrs upon D/C Planning.

## 2020-12-18 NOTE — NUR
Discharge instructions given as ordered. Encourage to follow up with PMD (Follow up with Dr. Macdonald in 1-2 weeks [phone] [address], [place] Follow up with [facility]) as instructed. All questions and concerns addressed. Patient verbalized 
understanding.  Medication reconciliation form completed and copy given to patient.  IV 
removed with catheter intact, pressure dressing applied.  Telemetry unit returned to ICU. 
Patient taken to vehicle via wheelchair with all personal belongings, accompanied by staff 
and family member. No distress noted at time of departure.

## 2020-12-18 NOTE — NUR
Paged Dr. Lopez regarding patient has [facility], [facility] does not [facility] the cost 
of the medications and patient does not want to pay out of the pocket.  Informed patient 
that [facility]. [facility] Awaiting for MD 
to call back.

## 2020-12-18 NOTE — NUR
Instructed [facility] and [facility] to get scripts. 
Patient verbalized understanding.

## 2020-12-18 NOTE — NUR
Patient forgot a wallet, Sahra HODGES called patient's daughter. Per daughter will pick it up 
tomorrow.

## 2020-12-18 NOTE — NUR
Discussed with RN, pt already has oxygen being delivered to home. Room air ABG for home O2 
qualification not done at this time.

## 2024-11-12 NOTE — NUR
A-line discontinued, pressure applied to site, covered with 4x4 and taped. Tolerated well. Call placed to [facility] pharmacy spoke with Estela who advises patient's insurance will only cover four medications per month and patient has reached limit this month. Prescription for pantoprazole is $29.35 she is not sure if there is an over the counter medication that would be cheaper.